# Patient Record
Sex: FEMALE | Race: WHITE | Employment: OTHER | URBAN - METROPOLITAN AREA
[De-identification: names, ages, dates, MRNs, and addresses within clinical notes are randomized per-mention and may not be internally consistent; named-entity substitution may affect disease eponyms.]

---

## 2018-08-03 ENCOUNTER — HOSPITAL ENCOUNTER (OUTPATIENT)
Dept: GENERAL RADIOLOGY | Age: 68
Discharge: HOME | End: 2018-08-05
Payer: COMMERCIAL

## 2018-08-03 ENCOUNTER — HOSPITAL ENCOUNTER (OUTPATIENT)
Age: 68
Discharge: HOME | End: 2018-08-05
Payer: COMMERCIAL

## 2018-08-03 DIAGNOSIS — M54.2 NECK PAIN: ICD-10-CM

## 2018-08-03 PROCEDURE — 72040 X-RAY EXAM NECK SPINE 2-3 VW: CPT

## 2022-05-02 ENCOUNTER — APPOINTMENT (OUTPATIENT)
Dept: GENERAL RADIOLOGY | Age: 72
End: 2022-05-02
Payer: MEDICARE

## 2022-05-02 ENCOUNTER — HOSPITAL ENCOUNTER (OUTPATIENT)
Age: 72
Setting detail: OBSERVATION
Discharge: HOME | End: 2022-05-04
Attending: EMERGENCY MEDICINE | Admitting: INTERNAL MEDICINE
Payer: MEDICARE

## 2022-05-02 DIAGNOSIS — R07.9 CHEST PAIN, UNSPECIFIED TYPE: Primary | ICD-10-CM

## 2022-05-02 DIAGNOSIS — I10 HYPERTENSION, UNSPECIFIED TYPE: ICD-10-CM

## 2022-05-02 LAB
ABSOLUTE EOS #: 0.29 K/UL (ref 0–0.44)
ABSOLUTE IMMATURE GRANULOCYTE: 0.02 K/UL (ref 0–0.3)
ABSOLUTE LYMPH #: 1.79 K/UL (ref 1.1–3.7)
ABSOLUTE MONO #: 0.56 K/UL (ref 0.1–1.2)
ANION GAP SERPL CALCULATED.3IONS-SCNC: 14 MMOL/L (ref 9–17)
BASOPHILS # BLD: 1 % (ref 0–2)
BASOPHILS ABSOLUTE: 0.07 K/UL (ref 0–0.2)
BUN BLDV-MCNC: 13 MG/DL (ref 8–23)
BUN/CREAT BLD: 19 (ref 9–20)
CALCIUM SERPL-MCNC: 9.7 MG/DL (ref 8.6–10.4)
CHLORIDE BLD-SCNC: 103 MMOL/L (ref 98–107)
CO2: 25 MMOL/L (ref 20–31)
CREAT SERPL-MCNC: 0.7 MG/DL (ref 0.5–0.9)
D-DIMER QUANTITATIVE: 0.42 MG/L FEU (ref 0–0.59)
EOSINOPHILS RELATIVE PERCENT: 4 % (ref 1–4)
GFR AFRICAN AMERICAN: >60 ML/MIN
GFR NON-AFRICAN AMERICAN: >60 ML/MIN
GFR SERPL CREATININE-BSD FRML MDRD: ABNORMAL ML/MIN/{1.73_M2}
GLUCOSE BLD-MCNC: 101 MG/DL (ref 70–99)
GLUCOSE BLD-MCNC: 99 MG/DL (ref 65–105)
HCT VFR BLD CALC: 48.4 % (ref 36.3–47.1)
HEMOGLOBIN: 15.3 G/DL (ref 11.9–15.1)
IMMATURE GRANULOCYTES: 0 %
LYMPHOCYTES # BLD: 22 % (ref 24–43)
MCH RBC QN AUTO: 27.6 PG (ref 25.2–33.5)
MCHC RBC AUTO-ENTMCNC: 31.6 G/DL (ref 28.4–34.8)
MCV RBC AUTO: 87.2 FL (ref 82.6–102.9)
MONOCYTES # BLD: 7 % (ref 3–12)
MYOGLOBIN: 22 NG/ML (ref 25–58)
MYOGLOBIN: 24 NG/ML (ref 25–58)
MYOGLOBIN: <21 NG/ML (ref 25–58)
NRBC AUTOMATED: 0 PER 100 WBC
PDW BLD-RTO: 13.6 % (ref 11.8–14.4)
PLATELET # BLD: 307 K/UL (ref 138–453)
PMV BLD AUTO: 8.7 FL (ref 8.1–13.5)
POTASSIUM SERPL-SCNC: 4.1 MMOL/L (ref 3.7–5.3)
RBC # BLD: 5.55 M/UL (ref 3.95–5.11)
SEG NEUTROPHILS: 66 % (ref 36–65)
SEGMENTED NEUTROPHILS ABSOLUTE COUNT: 5.45 K/UL (ref 1.5–8.1)
SODIUM BLD-SCNC: 142 MMOL/L (ref 135–144)
TROPONIN, HIGH SENSITIVITY: <6 NG/L (ref 0–14)
WBC # BLD: 8.2 K/UL (ref 3.5–11.3)

## 2022-05-02 PROCEDURE — G0378 HOSPITAL OBSERVATION PER HR: HCPCS

## 2022-05-02 PROCEDURE — 71045 X-RAY EXAM CHEST 1 VIEW: CPT

## 2022-05-02 PROCEDURE — 99222 1ST HOSP IP/OBS MODERATE 55: CPT | Performed by: NURSE PRACTITIONER

## 2022-05-02 PROCEDURE — 99285 EMERGENCY DEPT VISIT HI MDM: CPT

## 2022-05-02 PROCEDURE — 84484 ASSAY OF TROPONIN QUANT: CPT

## 2022-05-02 PROCEDURE — 1200000000 HC SEMI PRIVATE

## 2022-05-02 PROCEDURE — 85379 FIBRIN DEGRADATION QUANT: CPT

## 2022-05-02 PROCEDURE — 6370000000 HC RX 637 (ALT 250 FOR IP): Performed by: NURSE PRACTITIONER

## 2022-05-02 PROCEDURE — 2580000003 HC RX 258: Performed by: NURSE PRACTITIONER

## 2022-05-02 PROCEDURE — 85025 COMPLETE CBC W/AUTO DIFF WBC: CPT

## 2022-05-02 PROCEDURE — 80048 BASIC METABOLIC PNL TOTAL CA: CPT

## 2022-05-02 PROCEDURE — 73030 X-RAY EXAM OF SHOULDER: CPT

## 2022-05-02 PROCEDURE — 83874 ASSAY OF MYOGLOBIN: CPT

## 2022-05-02 PROCEDURE — 2500000003 HC RX 250 WO HCPCS: Performed by: PHYSICIAN ASSISTANT

## 2022-05-02 PROCEDURE — 82947 ASSAY GLUCOSE BLOOD QUANT: CPT

## 2022-05-02 PROCEDURE — 6370000000 HC RX 637 (ALT 250 FOR IP): Performed by: PHYSICIAN ASSISTANT

## 2022-05-02 PROCEDURE — 93005 ELECTROCARDIOGRAM TRACING: CPT | Performed by: EMERGENCY MEDICINE

## 2022-05-02 PROCEDURE — 96374 THER/PROPH/DIAG INJ IV PUSH: CPT

## 2022-05-02 PROCEDURE — 36415 COLL VENOUS BLD VENIPUNCTURE: CPT

## 2022-05-02 RX ORDER — SODIUM CHLORIDE 0.9 % (FLUSH) 0.9 %
5-40 SYRINGE (ML) INJECTION PRN
Status: DISCONTINUED | OUTPATIENT
Start: 2022-05-02 | End: 2022-05-04 | Stop reason: HOSPADM

## 2022-05-02 RX ORDER — METOPROLOL TARTRATE 5 MG/5ML
5 INJECTION INTRAVENOUS EVERY 8 HOURS PRN
Status: DISCONTINUED | OUTPATIENT
Start: 2022-05-02 | End: 2022-05-03

## 2022-05-02 RX ORDER — ACETAMINOPHEN 325 MG/1
650 TABLET ORAL EVERY 6 HOURS PRN
Status: DISCONTINUED | OUTPATIENT
Start: 2022-05-02 | End: 2022-05-04 | Stop reason: HOSPADM

## 2022-05-02 RX ORDER — ONDANSETRON 2 MG/ML
4 INJECTION INTRAMUSCULAR; INTRAVENOUS EVERY 6 HOURS PRN
Status: DISCONTINUED | OUTPATIENT
Start: 2022-05-02 | End: 2022-05-04 | Stop reason: HOSPADM

## 2022-05-02 RX ORDER — SODIUM CHLORIDE 0.9 % (FLUSH) 0.9 %
5-40 SYRINGE (ML) INJECTION EVERY 12 HOURS SCHEDULED
Status: DISCONTINUED | OUTPATIENT
Start: 2022-05-02 | End: 2022-05-04 | Stop reason: HOSPADM

## 2022-05-02 RX ORDER — HYDROXYZINE HYDROCHLORIDE 25 MG/1
50 TABLET, FILM COATED ORAL DAILY
Status: DISCONTINUED | OUTPATIENT
Start: 2022-05-03 | End: 2022-05-03

## 2022-05-02 RX ORDER — ENOXAPARIN SODIUM 100 MG/ML
30 INJECTION SUBCUTANEOUS 2 TIMES DAILY
Status: DISCONTINUED | OUTPATIENT
Start: 2022-05-03 | End: 2022-05-04 | Stop reason: HOSPADM

## 2022-05-02 RX ORDER — ATORVASTATIN CALCIUM 40 MG/1
40 TABLET, FILM COATED ORAL NIGHTLY
Status: DISCONTINUED | OUTPATIENT
Start: 2022-05-02 | End: 2022-05-03

## 2022-05-02 RX ORDER — PANTOPRAZOLE SODIUM 40 MG/1
40 TABLET, DELAYED RELEASE ORAL DAILY
Status: DISCONTINUED | OUTPATIENT
Start: 2022-05-02 | End: 2022-05-04 | Stop reason: HOSPADM

## 2022-05-02 RX ORDER — POLYETHYLENE GLYCOL 3350 17 G/17G
17 POWDER, FOR SOLUTION ORAL DAILY PRN
Status: DISCONTINUED | OUTPATIENT
Start: 2022-05-02 | End: 2022-05-04 | Stop reason: HOSPADM

## 2022-05-02 RX ORDER — METOPROLOL TARTRATE 5 MG/5ML
5 INJECTION INTRAVENOUS ONCE
Status: COMPLETED | OUTPATIENT
Start: 2022-05-02 | End: 2022-05-02

## 2022-05-02 RX ORDER — EZETIMIBE 10 MG/1
10 TABLET ORAL DAILY
Status: DISCONTINUED | OUTPATIENT
Start: 2022-05-02 | End: 2022-05-04 | Stop reason: HOSPADM

## 2022-05-02 RX ORDER — SODIUM CHLORIDE 9 MG/ML
INJECTION, SOLUTION INTRAVENOUS PRN
Status: DISCONTINUED | OUTPATIENT
Start: 2022-05-02 | End: 2022-05-04 | Stop reason: HOSPADM

## 2022-05-02 RX ORDER — HYDROCODONE BITARTRATE AND ACETAMINOPHEN 5; 325 MG/1; MG/1
1 TABLET ORAL EVERY 4 HOURS PRN
Status: DISCONTINUED | OUTPATIENT
Start: 2022-05-02 | End: 2022-05-04 | Stop reason: HOSPADM

## 2022-05-02 RX ORDER — ASPIRIN 81 MG/1
81 TABLET, CHEWABLE ORAL DAILY
Status: DISCONTINUED | OUTPATIENT
Start: 2022-05-03 | End: 2022-05-04 | Stop reason: HOSPADM

## 2022-05-02 RX ORDER — ASPIRIN 81 MG/1
324 TABLET, CHEWABLE ORAL ONCE
Status: COMPLETED | OUTPATIENT
Start: 2022-05-02 | End: 2022-05-02

## 2022-05-02 RX ORDER — TRAMADOL HYDROCHLORIDE 50 MG/1
25 TABLET ORAL EVERY 6 HOURS PRN
Status: DISCONTINUED | OUTPATIENT
Start: 2022-05-02 | End: 2022-05-03 | Stop reason: SDUPTHER

## 2022-05-02 RX ORDER — ZOLPIDEM TARTRATE 5 MG/1
5 TABLET ORAL NIGHTLY PRN
Status: DISCONTINUED | OUTPATIENT
Start: 2022-05-02 | End: 2022-05-02

## 2022-05-02 RX ORDER — ENOXAPARIN SODIUM 100 MG/ML
40 INJECTION SUBCUTANEOUS DAILY
Status: DISCONTINUED | OUTPATIENT
Start: 2022-05-03 | End: 2022-05-02 | Stop reason: DRUGHIGH

## 2022-05-02 RX ORDER — CALCIUM CARBONATE-CHOLECALCIFEROL TAB 250 MG-125 UNIT 250-125 MG-UNIT
1 TAB ORAL DAILY
Status: DISCONTINUED | OUTPATIENT
Start: 2022-05-03 | End: 2022-05-04 | Stop reason: HOSPADM

## 2022-05-02 RX ORDER — ONDANSETRON 4 MG/1
4 TABLET, ORALLY DISINTEGRATING ORAL EVERY 8 HOURS PRN
Status: DISCONTINUED | OUTPATIENT
Start: 2022-05-02 | End: 2022-05-04 | Stop reason: HOSPADM

## 2022-05-02 RX ORDER — ACETAMINOPHEN 650 MG/1
650 SUPPOSITORY RECTAL EVERY 6 HOURS PRN
Status: DISCONTINUED | OUTPATIENT
Start: 2022-05-02 | End: 2022-05-04 | Stop reason: HOSPADM

## 2022-05-02 RX ORDER — HYDROCODONE BITARTRATE AND ACETAMINOPHEN 5; 325 MG/1; MG/1
2 TABLET ORAL EVERY 4 HOURS PRN
Status: DISCONTINUED | OUTPATIENT
Start: 2022-05-02 | End: 2022-05-04 | Stop reason: HOSPADM

## 2022-05-02 RX ADMIN — ATORVASTATIN CALCIUM 40 MG: 40 TABLET, FILM COATED ORAL at 23:10

## 2022-05-02 RX ADMIN — ASPIRIN 81 MG 324 MG: 81 TABLET ORAL at 17:41

## 2022-05-02 RX ADMIN — TRAMADOL HYDROCHLORIDE 25 MG: 50 TABLET, COATED ORAL at 20:06

## 2022-05-02 RX ADMIN — EZETIMIBE 10 MG: 10 TABLET ORAL at 23:32

## 2022-05-02 RX ADMIN — SODIUM CHLORIDE, PRESERVATIVE FREE 10 ML: 5 INJECTION INTRAVENOUS at 23:12

## 2022-05-02 RX ADMIN — PANTOPRAZOLE SODIUM 40 MG: 40 TABLET, DELAYED RELEASE ORAL at 23:32

## 2022-05-02 RX ADMIN — METOPROLOL TARTRATE 5 MG: 5 INJECTION INTRAVENOUS at 19:09

## 2022-05-02 RX ADMIN — HYDROCODONE BITARTRATE AND ACETAMINOPHEN 2 TABLET: 5; 325 TABLET ORAL at 23:08

## 2022-05-02 ASSESSMENT — PAIN - FUNCTIONAL ASSESSMENT
PAIN_FUNCTIONAL_ASSESSMENT: ACTIVITIES ARE NOT PREVENTED
PAIN_FUNCTIONAL_ASSESSMENT: ACTIVITIES ARE NOT PREVENTED

## 2022-05-02 ASSESSMENT — PAIN DESCRIPTION - DIRECTION: RADIATING_TOWARDS: SHOULDER AND ARM

## 2022-05-02 ASSESSMENT — HEART SCORE
ECG: 0
ECG: 0

## 2022-05-02 ASSESSMENT — PAIN DESCRIPTION - LOCATION
LOCATION: CHEST
LOCATION: SHOULDER
LOCATION: SHOULDER

## 2022-05-02 ASSESSMENT — PAIN SCALES - GENERAL
PAINLEVEL_OUTOF10: 3
PAINLEVEL_OUTOF10: 4
PAINLEVEL_OUTOF10: 5
PAINLEVEL_OUTOF10: 2

## 2022-05-02 ASSESSMENT — PAIN DESCRIPTION - DESCRIPTORS
DESCRIPTORS: ACHING
DESCRIPTORS: ACHING

## 2022-05-02 ASSESSMENT — PAIN DESCRIPTION - ORIENTATION
ORIENTATION: LEFT
ORIENTATION: LEFT;POSTERIOR
ORIENTATION: LEFT;ANTERIOR;POSTERIOR

## 2022-05-02 ASSESSMENT — PAIN DESCRIPTION - PAIN TYPE
TYPE: ACUTE PAIN
TYPE: ACUTE PAIN

## 2022-05-02 ASSESSMENT — PAIN DESCRIPTION - FREQUENCY: FREQUENCY: CONTINUOUS

## 2022-05-02 NOTE — ED PROVIDER NOTES
[address] [place] [address] ED  eMERGENCY dEPARTMENTFormerly [facility]      Pt Name: Chelsey Hawthorne  MRN: [id number]  [place] 1950  Date ofevaluation: 5/2/2022  Provider: Katherine Rg Dr       Chief Complaint   Patient presents with    Chest Pain         HISTORY OF PRESENT ILLNESS  (Location/Symptom, Timing/Onset, Context/Setting, Quality, Duration, Modifying Factors, Severity.)   Chelsey Hawthorne is a 80 y.o. female who presents to the emergency department with left shoulder pain since Friday. Pain radiates from the left upper chest into her left arm to the elbow. No definite alleviating or aggravating factors. Nursing Notes were reviewed. ALLERGIES     Dye [iodides]    CURRENT MEDICATIONS       Previous Medications    CALCIUM-VITAMIN D (OSCAL) 250-125 MG-UNIT PER TABLET    Take 1 tablet by mouth daily    CHOLECALCIFEROL (VITAMIN D) 2000 UNITS CAPS CAPSULE    Take by mouth    DOXYCYCLINE (VIBRA-TABS) 100 MG TABLET    Take 1 tablet by mouth 2 times daily    EZETIMIBE (ZETIA) 10 MG TABLET    Take 10 mg by mouth daily    HYDROXYZINE (ATARAX) 50 MG TABLET    Take 50 mg by mouth daily    IBUPROFEN (ADVIL;MOTRIN) 800 MG TABLET    Take 1 tablet by mouth every 8 hours as needed for Pain    OMEPRAZOLE (PRILOSEC) 40 MG CAPSULE    Take 40 mg by mouth daily    OXYCODONE-ACETAMINOPHEN (PERCOCET) 5-325 MG PER TABLET    Take 1-2 tablets by mouth every 4 hours as needed for Pain    ZOLPIDEM (AMBIEN) 5 MG TABLET    Take 5 mg by mouth nightly as needed for Sleep       PAST MEDICAL HISTORY         Diagnosis Date    GERD (gastroesophageal reflux disease)     Hyperlipidemia     Insomnia        SURGICAL HISTORY           Procedure Laterality Date    HERNIA REPAIR           FAMILY HISTORY     History reviewed. No pertinent family history. No family status information on file. SOCIAL HISTORY      reports that she has quit smoking. She does not have any smokeless tobacco history on file.  She reports current alcohol use. She reports that she does not use drugs. REVIEW OFSYSTEMS    (2-9 systems for level 4, 10 or more for level 5)   Review of Systems    Except as noted above the remainder of the review of systems was reviewed and negative. PHYSICAL EXAM    (up to 7 for level 4, 8 or more for level 5)     ED Triage Vitals [05/02/22 1617]   BP Temp Temp Source Pulse Resp SpO2 Height Weight   (!) 172/116 97.8 °F (36.6 °C) Oral 98 18 97 % 5' 6\" (1.676 m) 232 lb (105.2 kg)      Physical Exam  Constitutional:       Appearance: She is well-developed. HENT:      Head: Normocephalic and atraumatic. Cardiovascular:      Rate and Rhythm: Normal rate and regular rhythm. Pulmonary:      Effort: Pulmonary effort is normal.      Breath sounds: Normal breath sounds. Abdominal:      Palpations: Abdomen is soft. Musculoskeletal:         General: Normal range of motion. Cervical back: Normal range of motion and neck supple. Skin:     General: Skin is warm. Findings: No rash. Neurological:      Mental Status: She is alert and oriented to person, place, and time. Psychiatric:         Behavior: Behavior normal.                 DIAGNOSTIC RESULTS     EKG: All EKG's are interpreted by the Emergency Department Physician who either signs or Co-signs this chart in the absence of a cardiologist.  EKG interpreted by ED physician. Time 1623.  96 bpm.  Normal sinus rhythm. No ST segment elevation.       RADIOLOGY:   Non-plain film images such as CT, Ultrasound and MRI are read by the radiologist. Plain radiographic images arevisualized and preliminarily interpreted by the emergency physician with the below findings:        Interpretation per the Radiologist below, if available at thetime of this note:          ED BEDSIDE ULTRASOUND:   Performed by ED Physician - none    LABS:  Labs Reviewed   TROP/MYOGLOBIN - Abnormal; Notable for the following components:       Result Value    Myoglobin 24 (*)     All other components within normal limits   BASIC METABOLIC PANEL - Abnormal; Notable for the following components:    Glucose 101 (*)     All other components within normal limits   CBC WITH AUTO DIFFERENTIAL - Abnormal; Notable for the following components:    RBC 5.55 (*)     Hemoglobin 15.3 (*)     Hematocrit 48.4 (*)     Seg Neutrophils 66 (*)     Lymphocytes 22 (*)     All other components within normal limits   D-DIMER, QUANTITATIVE   TROP/MYOGLOBIN   TROP/MYOGLOBIN       All other labs were within normal range or not returned as of this dictation. EMERGENCY DEPARTMENT COURSE and DIFFERENTIAL DIAGNOSIS/MDM:   Vitals:    Vitals:    05/02/22 1617 05/02/22 1839 05/02/22 1845   BP: (!) 172/116 (!) 150/102 (!) 140/105   Pulse: 98 87 88   Resp: 18 20 21   Temp: 97.8 °F (36.6 °C)     TempSrc: Oral     SpO2: 97% 94% 93%   Weight: 232 lb (105.2 kg)     Height: 5' 6\" (1.676 m)       Will admit. Heart score is 5. Given patient's age risk factors she will admit for further cardiac evaluation. Patient has not had any provocative cardiac testing in over 5 years. 6:50 PM EDT  Spoke with dr Sophie Huerta and he asked that we discuss case cardiology prior to admitting.     6:56 PM EDT  Spoke with dr Colby Sykes . He recommends admitting the patient and doing stress int he morning. IV lopressor GIVEN FOR htn                                CONSULTS:  IP CONSULT TO HOSPITALIST  IP CONSULT TO CARDIOLOGY    PROCEDURES:  Procedures  CRITICAL CARE TIME     Due to the high probability of sudden and clinically significant deterioration in the patient's condition she required highest level of my preparedness to intervene urgently. I provided critical care time including documentation time, medication orders and management, reevaluation, vital sign assessment, ordering and reviewing of of lab tests ordering and reviewing of x-ray studies, and admission orders.  Aggregate critical care time is between 35  minutes including only time during which I was engaged in work directly related to her care and did not include time spent treating other patients simultaneously. FINAL IMPRESSION      1. Chest pain, unspecified type    2. Hypertension, unspecified type          DISPOSITION/PLAN   DISPOSITION Decision To Admit 05/02/2022 06:29:59 PM      PATIENTREFERRED TO:   No follow-up provider specified.     DISCHARGE MEDICATIONS:     New Prescriptions    No medications on file           (Please note that portions of this note were completed with a voice recognition program.  Efforts were made to edit thedictations but occasionally words are mis-transcribed.)    DENI Dickerson PA-C  05/02/22 8762

## 2022-05-02 NOTE — ED PROVIDER NOTES
(gastroesophageal reflux disease)     Hyperlipidemia     Insomnia      SURGICAL HISTORY       Past Surgical History:   Procedure Laterality Date    HERNIA REPAIR       CURRENT MEDICATIONS       Previous Medications    CALCIUM-VITAMIN D (OSCAL) 250-125 MG-UNIT PER TABLET    Take 1 tablet by mouth daily    CHOLECALCIFEROL (VITAMIN D) 2000 UNITS CAPS CAPSULE    Take by mouth    DOXYCYCLINE (VIBRA-TABS) 100 MG TABLET    Take 1 tablet by mouth 2 times daily    EZETIMIBE (ZETIA) 10 MG TABLET    Take 10 mg by mouth daily    HYDROXYZINE (ATARAX) 50 MG TABLET    Take 50 mg by mouth daily    IBUPROFEN (ADVIL;MOTRIN) 800 MG TABLET    Take 1 tablet by mouth every 8 hours as needed for Pain    OMEPRAZOLE (PRILOSEC) 40 MG CAPSULE    Take 40 mg by mouth daily    OXYCODONE-ACETAMINOPHEN (PERCOCET) 5-325 MG PER TABLET    Take 1-2 tablets by mouth every 4 hours as needed for Pain    ZOLPIDEM (AMBIEN) 5 MG TABLET    Take 5 mg by mouth nightly as needed for Sleep     ALLERGIES     is allergic to dye [iodides]. FAMILY HISTORY     has no family status information on file. SOCIAL HISTORY       Social History     Tobacco Use    Smoking status: Former Smoker    Smokeless tobacco: Not on file   Substance Use Topics    Alcohol use: Yes     Comment: social    Drug use: No          Hemanth Rae MD  The care is provided during an unprecedented national emergency due to the novel coronavirus, COVID 19.   Attending Emergency Physician          Hemanth Rae MD  29/39/83 029

## 2022-05-03 LAB
EKG ATRIAL RATE: 79 BPM
EKG ATRIAL RATE: 96 BPM
EKG P AXIS: 37 DEGREES
EKG P AXIS: 62 DEGREES
EKG P-R INTERVAL: 142 MS
EKG P-R INTERVAL: 144 MS
EKG Q-T INTERVAL: 366 MS
EKG Q-T INTERVAL: 392 MS
EKG QRS DURATION: 82 MS
EKG QRS DURATION: 82 MS
EKG QTC CALCULATION (BAZETT): 449 MS
EKG QTC CALCULATION (BAZETT): 462 MS
EKG R AXIS: -37 DEGREES
EKG R AXIS: -38 DEGREES
EKG T AXIS: -7 DEGREES
EKG T AXIS: 37 DEGREES
EKG VENTRICULAR RATE: 79 BPM
EKG VENTRICULAR RATE: 96 BPM
HCT VFR BLD CALC: 45.3 % (ref 36.3–47.1)
HEMOGLOBIN: 14.4 G/DL (ref 11.9–15.1)
LV EF: 65 %
LVEF MODALITY: NORMAL
MCH RBC QN AUTO: 27.5 PG (ref 25.2–33.5)
MCHC RBC AUTO-ENTMCNC: 31.8 G/DL (ref 28.4–34.8)
MCV RBC AUTO: 86.6 FL (ref 82.6–102.9)
NRBC AUTOMATED: 0 PER 100 WBC
PDW BLD-RTO: 13.6 % (ref 11.8–14.4)
PLATELET # BLD: 151 K/UL (ref 138–453)
PMV BLD AUTO: 10.7 FL (ref 8.1–13.5)
RBC # BLD: 5.23 M/UL (ref 3.95–5.11)
TROPONIN, HIGH SENSITIVITY: <6 NG/L (ref 0–14)
WBC # BLD: 6.5 K/UL (ref 3.5–11.3)

## 2022-05-03 PROCEDURE — 93005 ELECTROCARDIOGRAM TRACING: CPT | Performed by: NURSE PRACTITIONER

## 2022-05-03 PROCEDURE — G0378 HOSPITAL OBSERVATION PER HR: HCPCS

## 2022-05-03 PROCEDURE — 6370000000 HC RX 637 (ALT 250 FOR IP): Performed by: NURSE PRACTITIONER

## 2022-05-03 PROCEDURE — 2580000003 HC RX 258: Performed by: NURSE PRACTITIONER

## 2022-05-03 PROCEDURE — 36415 COLL VENOUS BLD VENIPUNCTURE: CPT

## 2022-05-03 PROCEDURE — 84484 ASSAY OF TROPONIN QUANT: CPT

## 2022-05-03 PROCEDURE — 93005 ELECTROCARDIOGRAM TRACING: CPT | Performed by: PHYSICIAN ASSISTANT

## 2022-05-03 PROCEDURE — 85027 COMPLETE CBC AUTOMATED: CPT

## 2022-05-03 PROCEDURE — 93306 TTE W/DOPPLER COMPLETE: CPT

## 2022-05-03 PROCEDURE — 99225 PR SBSQ OBSERVATION CARE/DAY 25 MINUTES: CPT | Performed by: INTERNAL MEDICINE

## 2022-05-03 RX ORDER — POTASSIUM CHLORIDE 20 MEQ/1
40 TABLET, EXTENDED RELEASE ORAL PRN
Status: DISCONTINUED | OUTPATIENT
Start: 2022-05-03 | End: 2022-05-04 | Stop reason: HOSPADM

## 2022-05-03 RX ORDER — ASPIRIN 81 MG/1
81 TABLET, CHEWABLE ORAL DAILY
COMMUNITY

## 2022-05-03 RX ORDER — LISINOPRIL 5 MG/1
5 TABLET ORAL DAILY
Status: DISCONTINUED | OUTPATIENT
Start: 2022-05-03 | End: 2022-05-04 | Stop reason: HOSPADM

## 2022-05-03 RX ORDER — MAGNESIUM SULFATE 1 G/100ML
1000 INJECTION INTRAVENOUS PRN
Status: DISCONTINUED | OUTPATIENT
Start: 2022-05-03 | End: 2022-05-04 | Stop reason: HOSPADM

## 2022-05-03 RX ORDER — POTASSIUM CHLORIDE 7.45 MG/ML
10 INJECTION INTRAVENOUS PRN
Status: DISCONTINUED | OUTPATIENT
Start: 2022-05-03 | End: 2022-05-04 | Stop reason: HOSPADM

## 2022-05-03 RX ADMIN — SODIUM CHLORIDE, PRESERVATIVE FREE 10 ML: 5 INJECTION INTRAVENOUS at 10:20

## 2022-05-03 RX ADMIN — LISINOPRIL 5 MG: 5 TABLET ORAL at 10:13

## 2022-05-03 RX ADMIN — SODIUM CHLORIDE, PRESERVATIVE FREE 10 ML: 5 INJECTION INTRAVENOUS at 21:23

## 2022-05-03 RX ADMIN — PANTOPRAZOLE SODIUM 40 MG: 40 TABLET, DELAYED RELEASE ORAL at 21:23

## 2022-05-03 RX ADMIN — HYDROCODONE BITARTRATE AND ACETAMINOPHEN 1 TABLET: 5; 325 TABLET ORAL at 20:14

## 2022-05-03 RX ADMIN — EZETIMIBE 10 MG: 10 TABLET ORAL at 21:23

## 2022-05-03 RX ADMIN — HYDROCODONE BITARTRATE AND ACETAMINOPHEN 1 TABLET: 5; 325 TABLET ORAL at 13:12

## 2022-05-03 RX ADMIN — ACETAMINOPHEN 650 MG: 325 TABLET ORAL at 10:11

## 2022-05-03 RX ADMIN — ASPIRIN 81 MG 81 MG: 81 TABLET ORAL at 10:09

## 2022-05-03 ASSESSMENT — PAIN DESCRIPTION - ORIENTATION
ORIENTATION: MID
ORIENTATION: LEFT
ORIENTATION: LEFT

## 2022-05-03 ASSESSMENT — PAIN DESCRIPTION - LOCATION
LOCATION: HEAD
LOCATION: SHOULDER
LOCATION: SHOULDER

## 2022-05-03 ASSESSMENT — ENCOUNTER SYMPTOMS
BLOOD IN STOOL: 0
COLOR CHANGE: 0
SHORTNESS OF BREATH: 0
NAUSEA: 0
COUGH: 0
DIARRHEA: 0
ABDOMINAL PAIN: 0
VOMITING: 0
CONSTIPATION: 0

## 2022-05-03 ASSESSMENT — PAIN DESCRIPTION - DESCRIPTORS
DESCRIPTORS: ACHING
DESCRIPTORS: ACHING;SORE

## 2022-05-03 ASSESSMENT — PAIN SCALES - GENERAL
PAINLEVEL_OUTOF10: 3
PAINLEVEL_OUTOF10: 4
PAINLEVEL_OUTOF10: 4
PAINLEVEL_OUTOF10: 0

## 2022-05-03 NOTE — PROGRESS NOTES
[facility]  Office: [address], DO, Adam Reardon, DO, Adelfo Bowden, DO, Joyce Alfredo Blood, DO, Sarah Bauer MD, Lisy Phan MD, Brenna Patricia MD, Heidi Nixon MD, Carley Enamorado MD, Chepe Rodriguez MD, Ashish Grier MD, Jonelle Self, DO, Becca Melendrez, DO, Gabriele Hendrickson MD,  Mattie Davila DO, Sharyn Bell MD, Michael Enamorado MD, Wang Henriquez MD, Carie Gonsalez DO, Eli Marquez MD, Yoshi Garcia MD, Olivia Faith [facility], [facility], CNP, Kelle Marcus, CNP, Zarina Wolfe, CNP, Irving Faye, CNP, Lalit Quezada, CNP, ABISAI CrumpC, Alena Graf, [facility], Crystal Graves, CNP, Laura Zaragoza, CNP, Ayse Fang, CNP, Piyush Kelley, CNS, Dolores Ogden, [facility], Darwin Mobley, CNP, Amish Brown, CNP, Nancy Montenegro, [address]    Progress Note    5/3/2022    3:06 PM    Name:   Jean De La Fuente  MRN:     [id number]     [place]:      [de-identified]   Room:   2014/2014-02   Day:  1  Admit Date:  5/2/2022  4:57 PM    PCP:   Irma Lomas MD  Code Status:  Full Code    Subjective:     C/C:   Chief Complaint   Patient presents with    Chest Pain     Interval History Status: improved. Patient seen and examined this afternoon.  is at bedside. Patient continues to have left shoulder pain. She does admit intermittent appearing chest pain. No nausea, vomiting, diarrhea. States that she did sleep uncomfortably on Friday. Also reports lifting a heavy backpack last week. Reports that she had her last stress test about 5 years ago after she was having significant right shoulder pain. Brief History:     Aidee Martinez is a 80year old  female who presented to [place] [address] on 5/2/2022 for evaluation of chest pressure. Troponin and EKG negative. She quit smoking in 1982. Has a strong family history. Likely musculoskeletal in nature.   Checking 2D echo and possible auscultation bilaterally, normal effort  Heart:  regular rate and rhythm, no murmur  Abdomen:  soft, nontender, nondistended, normal bowel sounds, no masses, hepatomegaly, splenomegaly  Extremities:  no edema, redness, tenderness in the calves  MSK: negative neer and hawkin's sign, no tenderness to palpation of the chest wall or back  Skin:  no gross lesions, rashes, induration    Assessment:        Hospital Problems           Last Modified POA    * (Principal) Atypical chest pain 5/3/2022 Yes    GERD (gastroesophageal reflux disease) 5/3/2022 Yes    Dyslipidemia 5/3/2022 Yes    Primary hypertension 5/3/2022 Yes    Class 2 severe obesity due to excess calories with serious comorbidity and body mass index (BMI) of 37.0 to 37.9 in adult ([facility] [address] 5/3/2022 Yes          Plan:        1. Atypical chest pain - appreciate cardiology's input. Checking 2D echo. If low risk, will schedule outpatient stress test.  2. GERD-continue Protonix  3. Dyslipidemia-continue Zetia  4. Essential hypertension- continue lisinopril 5 mg daily. 5. Obesity - BMI 38.9. Diet/weight loss/exercise recommended  6.  Change to obs status    CHRISTINA ESTRADA DO  5/3/2022  3:06 PM

## 2022-05-03 NOTE — PLAN OF CARE
Problem: Cardiovascular - Adult  Goal: Maintains optimal cardiac output and hemodynamic stability  Outcome: Progressing  Goal: Absence of cardiac dysrhythmias or at baseline  Outcome: Progressing     Problem: Pain  Goal: Verbalizes/displays adequate comfort level or baseline comfort level  Outcome: Adequate for Discharge     Problem: Safety - Adult  Goal: Free from fall injury  Outcome: Adequate for Discharge     Problem: ABCDS Injury Assessment  Goal: Absence of physical injury  Outcome: Adequate for Discharge

## 2022-05-03 NOTE — CONSULTS
Reason for Consult:  Chest pain  Requesting Physician: Ximena Mendez DO    CHIEF COMPLAINT:  Chest pain    History Obtained From:  patient, electronic medical record    HISTORY OF PRESENT ILLNESS:      The patient is a 80 y.o. female with significant past medical history of hyperlipidemia and no known prior cardiac history and does not follow with a cardiologist who presents with chest pain. The patient reports that she awoke on Friday with left elbow pain and over the next day she eventually developed chest pain and left elbow pain that made her concerned. She had symptoms continuously throughout the day that gets worse at night for the past 4 days. 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 She reports she is able to walk up and down the stairs to do laundry without any significant shortness of breath or chest pain. She reports that the chest pain is continuous without associated shortness of breath or diaphoresis. She denies having any palpitations, syncope, or near syncope. She denies any recent fever or chills. Blood pressure was admitted in ER but the patient reports normally her blood pressure is normotensive at her office visits. She feels it was elevated due to anxiety of being in the ER. Past Medical History:    Past Medical History:   Diagnosis Date    GERD (gastroesophageal reflux disease)     Hyperlipidemia     Insomnia      Past Surgical History:    Past Surgical History:   Procedure Laterality Date    HERNIA REPAIR       Home Medications:  Prior to Admission medications    Medication Sig Start Date End Date Taking?  Authorizing Provider   zolpidem (AMBIEN) 5 MG tablet Take 5 mg by mouth nightly as needed for Sleep  Patient not taking: Reported on 5/2/2022    Historical Provider, MD   hydrOXYzine (ATARAX) 50 MG tablet Take 50 mg by mouth daily    Historical Provider, MD   ezetimibe (ZETIA) 10 MG tablet Take 10 mg by mouth daily    Historical Provider, MD   omeprazole (PRILOSEC) 40 MG capsule Take 40 mg by mouth daily    Historical Provider, MD   calcium-vitamin D (OSCAL) 250-125 MG-UNIT per tablet Take 1 tablet by mouth daily    Historical Provider, MD   Cholecalciferol (VITAMIN D) 2000 UNITS CAPS capsule Take by mouth    Historical Provider, MD   ibuprofen (ADVIL;MOTRIN) 800 MG tablet Take 1 tablet by mouth every 8 hours as needed for Pain 9/5/15   Connie Nuñez MD   oxyCODONE-acetaminophen (PERCOCET) 5-325 MG per tablet Take 1-2 tablets by mouth every 4 hours as needed for Pain  Patient not taking: Reported on 5/2/2022 9/5/15   Connie Nuñez MD   doxycycline (VIBRA-TABS) 100 MG tablet Take 1 tablet by mouth 2 times daily  Patient not taking: Reported on 5/2/2022 9/5/15   Connie Nuñez MD     Current Medications:    Current Facility-Administered Medications   Medication Dose Route Frequency Provider Last Rate Last Admin    lisinopril (PRINIVIL;ZESTRIL) tablet 5 mg  5 mg Oral Daily Verlan Low, APRN - CNP        magnesium sulfate 1000 mg in dextrose 5% 100 mL IVPB  1,000 mg IntraVENous PRN Verlan Low, APRN - CNP        potassium chloride (KLOR-CON M) extended release tablet 40 mEq  40 mEq Oral PRN Verlan Low, APRN - CNP        Or    potassium bicarb-citric acid (EFFER-K) effervescent tablet 40 mEq  40 mEq Oral PRN Verlan Low, APRN - CNP        Or    potassium chloride 10 mEq/100 mL IVPB (Peripheral Line)  10 mEq IntraVENous PRN Verlan Low, APRN - CNP        calcium carb-cholecalciferol 250-125 MG-UNIT per tab 1 tablet  1 tablet Oral Daily Alexandria Stemple, APRN - NP        ezetimibe (ZETIA) tablet 10 mg  10 mg Oral Daily Alexandria Stemple, APRN - NP   10 mg at 05/02/22 2332    hydrOXYzine (ATARAX) tablet 50 mg  50 mg Oral Daily Alexandria Stemple, APRN - NP        pantoprazole (PROTONIX) tablet 40 mg  40 mg Oral Daily Alexandria Stemple, APRN - NP   40 mg at 05/02/22 2332    sodium chloride flush 0.9 % injection 5-40 mL  5-40 mL IntraVENous 2 times per day Alexandria Stemple, APRN - NP   10 mL at 05/02/22 2312    sodium chloride flush 0.9 % injection 5-40 mL  5-40 mL IntraVENous PRN Alexandria Stemple, APRN - NP        0.9 % sodium chloride infusion   IntraVENous PRN Alexandria Stemple, APRN - NP        ondansetron (ZOFRAN-ODT) disintegrating tablet 4 mg  4 mg Oral Q8H PRN Alexandria Stemple, APRN - NP        Or    ondansetron (ZOFRAN) injection 4 mg  4 mg IntraVENous Q6H PRN Alexandria Stemple, APRN - NP        acetaminophen (TYLENOL) tablet 650 mg  650 mg Oral Q6H PRN Alexandria Stemple, APRN - NP        Or    acetaminophen (TYLENOL) suppository 650 mg  650 mg Rectal Q6H PRN Alexandria Stemple, APRN - NP        polyethylene glycol (GLYCOLAX) packet 17 g  17 g Oral Daily PRN Alexandria Stemple, APRN - NP        aspirin chewable tablet 81 mg  81 mg Oral Daily Alexandria Stemple, APRN - NP        traMADol (ULTRAM) tablet 25 mg  25 mg Oral Q6H PRN Alexandria Stemple, APRN - NP   25 mg at 05/02/22 2006    HYDROcodone-acetaminophen (NORCO) 5-325 MG per tablet 1 tablet  1 tablet Oral Q4H PRN Ayse Cedar, APRN - CNP        Or    HYDROcodone-acetaminophen (NORCO) 5-325 MG per tablet 2 tablet  2 tablet Oral Q4H PRN Ayse Cedar, APRN - CNP   2 tablet at 05/02/22 2308    enoxaparin Sodium (LOVENOX) injection 30 mg  30 mg SubCUTAneous BID Alexandria Stemple, APRN - NP         Allergies:  Dye [iodides]    Social History:    Social History     Socioeconomic History    Marital status:      Spouse name: Not on file    Number of children: Not on file    Years of education: Not on file    Highest education level: Not on file   Occupational History    Not on file   Tobacco Use    Smoking status: Former Smoker    Smokeless tobacco: Not on file   Substance and Sexual Activity    Alcohol use: Yes     Comment: social    Drug use: No    Sexual activity: Not on file   Other Topics Concern  Not on file   Social History Narrative    Not on file     Social Determinants of Health     Financial Resource Strain:     Difficulty of Paying Living Expenses: Not on file   Food Insecurity:     Worried About Running Out of Food in the Last Year: Not on file    Nelson of Food in the Last Year: Not on file   Transportation Needs:     Lack of Transportation (Medical): Not on file    Lack of Transportation (Non-Medical):  Not on file   Physical Activity:     Days of Exercise per Week: Not on file    Minutes of Exercise per Session: Not on file   Stress:     Feeling of Stress : Not on file   Social Connections:     Frequency of Communication with Friends and Family: Not on file    Frequency of Social Gatherings with Friends and Family: Not on file    Attends Evangelical Services: Not on file    Active Member of [employer] or Organizations: Not on file    Attends Club or Organization Meetings: Not on file    Marital Status: Not on file   Intimate Partner Violence:     Fear of Current or Ex-Partner: Not on file    Emotionally Abused: Not on file    Physically Abused: Not on file    Sexually Abused: Not on file   Housing Stability:     Unable to Pay for Housing in the Last Year: Not on file    Number of [place] in the Last Year: Not on file    Unstable Housing in the Last Year: Not on file     Family History: Family history of CAD, brother  Family History   Problem Relation Age of Onset    No Known Problems Mother     No Known Problems Father        · REVIEW OF SYSTEMS   CONSTITUTIONAL: negative  EYES:  negative  HEENT:  negative  RESPIRATORY: negative   CARDIOVASCULAR:  negative except for  chest pain  GASTROINTESTINAL:  negative except for history of GERD  GENITOURINARY:  negative  INTEGUMENT:  negative  HEMATOLOGIC/LYMPHATIC:  negative  ALLERGIC/IMMUNOLOGIC:  negative except for allergy to dye  ENDOCRINE:  negative  MUSCULOSKELETAL:  negative except for left elbow pain  NEUROLOGICAL: negative, no history of CVA  BEHAVIOR/PSYCH:  negative    PHYSICAL EXAM:    Vitals:    VITALS:  BP (!) 122/102   Pulse 74   Temp 98.1 °F (36.7 °C) (Oral)   Resp 16   Ht 5' 6\" (1.676 m)   Wt 241 lb (109.3 kg)   SpO2 96%   BMI 38.90 kg/m²   24HR INTAKE/OUTPUT:      Intake/Output Summary (Last 24 hours) at 5/3/2022 0920  Last data filed at 5/3/2022 0602  Gross per 24 hour   Intake 10 ml   Output 350 ml   Net -340 ml       CONSTITUTIONAL:  awake, alert, cooperative, no apparent distress, and appears stated age  EYES: Sclera clear, conjunctiva normal  ENT:  normocepalic, without obvious abnormality  NECK:  supple, symmetrical, trachea midline, no carotid bruit, no JVD  BACK:  symmetric  LUNGS: Non-labored, good air exchange, clear to auscultation bilaterally, no crackles or wheezing  CARDIOVASCULAR:  Regular rate and rhythm, normal S1 and S2, no S3 or S4, and no murmur noted, no rub. Reproducible chest pain with palpation to the left anterior chest area that reproduces similar symptoms to her spontaneous pain  dorsalis pedis, posterior tibial and bilateralpresent 2+  ABDOMEN:  Normal bowel sounds, soft, non-distended, non-tender  MUSCULOSKELETAL:  there is no redness, warmth, or swelling of the joints   No leg edema. NEUROLOGIC:  Awake, alert, oriented to name, place and time. SKIN:  no bruising or bleeding, normal skin color, texture, turgor and no jaundice    DATA:   ECG:   Cardiology Labs:  Recent Labs     05/02/22  1740 05/02/22  1740 05/02/22  1924 05/02/22  2136 05/03/22  0028   MYOGLOBIN 24*  --  <21* 22*  --    TROPHS <6   < > <6 <6 <6    < > = values in this interval not displayed.      Warfarin PT/INR:No results found for: PROTIME, INR, WARFARIN  CBC:  Lab Results   Component Value Date    WBC 6.5 05/03/2022    RBC 5.23 05/03/2022    HGB 14.4 05/03/2022    HCT 45.3 05/03/2022    MCV 86.6 05/03/2022    MCH 27.5 05/03/2022    MCHC 31.8 05/03/2022    RDW 13.6 05/03/2022     05/03/2022    MPV 10.7 05/03/2022     CMP:  Lab Results   Component Value Date     05/02/2022    K 4.1 05/02/2022     05/02/2022    CO2 25 05/02/2022    BUN 13 05/02/2022    CREATININE 0.70 05/02/2022    GFRAA >60 05/02/2022    LABGLOM >60 05/02/2022    GLUCOSE 101 05/02/2022    CALCIUM 9.7 05/02/2022     Magnesium:  No results found for: MG  PTT:  No results found for: APTT, PTT  TSH:    Lab Results   Component Value Date    TSH 2.71 11/04/2011     BNP: No results for input(s): BNP, PROBNP in the last 72 hours. BMP:  Lab Results   Component Value Date     05/02/2022    K 4.1 05/02/2022     05/02/2022    CO2 25 05/02/2022    BUN 13 05/02/2022    CREATININE 0.70 05/02/2022    CALCIUM 9.7 05/02/2022    GFRAA >60 05/02/2022    LABGLOM >60 05/02/2022    GLUCOSE 101 05/02/2022     LIVER PROFILE:No results for input(s): AST, ALT, LABALBU, ALKPHOS, BILITOT, BILIDIR, IBILI, PROT, GLOB, ALBUMIN in the last 72 hours. FLP:    Lab Results   Component Value Date    CHOL 194 02/11/2013    TRIG 139 02/11/2013    HDL 54 02/11/2013    LDLCHOLESTEROL 112 02/11/2013     IMPRESSION    · Atypical chest pain without signs of acute MI but may be musculoskeletal in nature and has reproducible chest pain on exam with palpation to her left anterior chest area that is similar to her spontaneous pain  · Dyslipidemia, treated  · Obesity  · History of GERD  · Family history of CAD, brother    RECOMMENDATIONS:     Continue current cardiac medications. The patient's chest pain is atypical and constant for 4 days. May have a musculoskeletal component to her chest pain as she has reproducible chest pain on exam with palpation to her left anterior chest wall that reproduces similar symptoms to her spontaneous pain. Will check echocardiogram to evaluate LV systolic function and for any segmental wall motion abnormality.   If no significant abnormality is found and patient continues to have improvement in her chest pain will plan for outpatient exercise nuclear stress test to screen for significant ischemia. Management plan was discussed with patient, her  at the bedside, RN, and Dr. Sher Harrison. Thank you for the consultation.     Electronically signed by DINA Butterfield CNP on 5/3/2022 at 9:20 AM     CC: Monse Junior MD

## 2022-05-03 NOTE — H&P
[facility]  Office: [address], DO, Pedro Luis Matos, DO, Logan De La Fuente, DO, Karla Marvel Blood, DO, Emani Workman MD, Filemon Sims MD, Minerva Rodriguez MD, Júnior Hansen MD, Clayton Choudhury MD, Laurita Arzate MD, Lazarus Moats, MD, Jr Reese, DO, Lynne Matthews, DO, Ria Kwon MD,  Victorino Collins, DO, Jahaira Mae MD, Long Whitehead MD, Angel Perales MD, Vee Rao DO, Efren Garcia MD, Eden Pope MD, arnol Franklin, [facility], [facility], CNP, Derrek Kumar, CNP, Elvira Shabazz, CNP, Cordelia Masters, CNP, Rufus Monzon, CNP, Marie Cameron PA-C, Kerline López, [facility], Alejandro Triana, CNP, Giuseppe Covarrubias, CNP, Saeid Stevenson, CNP, Hemanth Dixon, CNS, Marta Torres, [facility], Vy Marin, CNP, Jose Fontanez, CNP, Chaya Anderson, [address]    HISTORY AND PHYSICAL EXAMINATION            Date:   5/2/2022  Patient name:  Tramaine Wilson  Date of admission:  5/2/2022  4:57 PM  MRN:   [id number]  Account:  [de-identified]  YOB: 1950  PCP:    Logan Vega MD  Room:   2014/2014-02  Code Status:    Full Code    Chief Complaint:     Chief Complaint   Patient presents with    Chest Pain     History Obtained From:     Patient and electronic medical record. History of Present Illness:     Traamine Wilson is a 80 y.o. Non- / non  female who presents with Chest Pain   and is admitted to the hospital for the management of Chest pain. Patient presents to the hospital with complaint of left chest, shoulder and arm pain. The patient states that her symptoms started approximately 4 days ago and have progressively worsened. She endorses pain to her left chest.  However she states her pain is greater in her left shoulder and arm. She describes her pain as constant, dull, aching and 4/10 in intensity. She states that worsens at night. She denies shortness of breath or diaphoresis.   No definitive aggravating or alleviating factors. She denies additional symptomology. She has past medical history that includes GERD and dyslipidemia. She was also noted to have a systolic blood pressure in the 140s to 150s. High-sensitivity troponin <6 x4. Hbg 15.3, hct 48.4. D dimer 0.42. EKG shows normal sinus rhythm, regular rate 96, no overt ST elevation or depression. Past Medical History:     Past Medical History:   Diagnosis Date    GERD (gastroesophageal reflux disease)     Hyperlipidemia     Insomnia       Past Surgical History:     Past Surgical History:   Procedure Laterality Date    HERNIA REPAIR          Medications Prior to Admission:     Prior to Admission medications    Medication Sig Start Date End Date Taking?  Authorizing Provider   zolpidem (AMBIEN) 5 MG tablet Take 5 mg by mouth nightly as needed for Sleep  Patient not taking: Reported on 5/2/2022    Historical Provider, MD   hydrOXYzine (ATARAX) 50 MG tablet Take 50 mg by mouth daily    Historical Provider, MD   ezetimibe (ZETIA) 10 MG tablet Take 10 mg by mouth daily    Historical Provider, MD   omeprazole (PRILOSEC) 40 MG capsule Take 40 mg by mouth daily    Historical Provider, MD   calcium-vitamin D (OSCAL) 250-125 MG-UNIT per tablet Take 1 tablet by mouth daily    Historical Provider, MD   Cholecalciferol (VITAMIN D) 2000 UNITS CAPS capsule Take by mouth    Historical Provider, MD   ibuprofen (ADVIL;MOTRIN) 800 MG tablet Take 1 tablet by mouth every 8 hours as needed for Pain 9/5/15   Trisha Grier MD   oxyCODONE-acetaminophen (PERCOCET) 5-325 MG per tablet Take 1-2 tablets by mouth every 4 hours as needed for Pain  Patient not taking: Reported on 5/2/2022 9/5/15   Trisha Grier MD   doxycycline (VIBRA-TABS) 100 MG tablet Take 1 tablet by mouth 2 times daily  Patient not taking: Reported on 5/2/2022 9/5/15   Trisha Grier MD        Allergies:     Dye [iodides]    Social History:     Tobacco: reports that she has quit smoking. She does not have any smokeless tobacco history on file. Alcohol:      reports current alcohol use. Drug Use:  reports no history of drug use. Family History:     Family History   Problem Relation Age of Onset    No Known Problems Mother     No Known Problems Father        Review of Systems:     Positive and Negative as described in HPI. Review of Systems   Constitutional: Negative for chills, diaphoresis and fever. HENT: Negative for congestion. Eyes: Negative for visual disturbance. Respiratory: Negative for cough and shortness of breath. Cardiovascular: Positive for chest pain. Negative for palpitations and leg swelling. Gastrointestinal: Negative for abdominal pain, blood in stool, constipation, diarrhea, nausea and vomiting. Endocrine: Negative for cold intolerance and heat intolerance. Genitourinary: Negative for difficulty urinating, dysuria, frequency and urgency. Musculoskeletal: Positive for arthralgias and myalgias. Skin: Negative for color change and rash. Neurological: Negative for dizziness, weakness, light-headedness, numbness and headaches. Hematological: Does not bruise/bleed easily. Psychiatric/Behavioral: The patient is not nervous/anxious. All other systems reviewed and are negative. Physical Exam:   BP (!) 152/95   Pulse 73   Temp 97.7 °F (36.5 °C) (Oral)   Resp 16   Ht 5' 6\" (1.676 m)   Wt 232 lb (105.2 kg)   SpO2 96%   BMI 37.45 kg/m²   Temp (24hrs), Av.8 °F (36.6 °C), Min:97.7 °F (36.5 °C), Max:97.8 °F (36.6 °C)    Recent Labs     22  2108   POCGLU 99     No intake or output data in the 24 hours ending 22 2239    Physical Exam  Vitals and nursing note reviewed. Constitutional:       Appearance: She is not diaphoretic. HENT:      Head: Normocephalic and atraumatic. Right Ear: Hearing normal.      Left Ear: Hearing normal.      Nose: Nose normal. No rhinorrhea.    Eyes:      General: Lids are normal.      Extraocular Movements:      Right eye: Normal extraocular motion. Left eye: Normal extraocular motion. Conjunctiva/sclera: Conjunctivae normal.      Right eye: Right conjunctiva is not injected. Left eye: Left conjunctiva is not injected. Pupils: Pupils are equal, round, and reactive to light. Pupils are equal.      Right eye: Pupil is reactive. Left eye: Pupil is reactive. Neck:      Thyroid: No thyromegaly. Trachea: Trachea normal. No tracheal deviation. Cardiovascular:      Rate and Rhythm: Normal rate and regular rhythm. Pulses: Normal pulses. Heart sounds: Normal heart sounds. Pulmonary:      Effort: Pulmonary effort is normal.      Breath sounds: Normal breath sounds. No decreased breath sounds. Abdominal:      General: Bowel sounds are normal. There is no distension. Palpations: Abdomen is soft. There is no mass. Tenderness: There is no abdominal tenderness. There is no guarding. Musculoskeletal:         General: No tenderness. Cervical back: Neck supple. Skin:     General: Skin is warm and dry. Neurological:      Mental Status: She is alert and oriented to person, place, and time.    Psychiatric:         Speech: Speech normal.         Behavior: Behavior normal.         Investigations:      Laboratory Testing:  Recent Results (from the past 24 hour(s))   EKG 12 Lead    Collection Time: 05/02/22  4:23 PM   Result Value Ref Range    Ventricular Rate 96 BPM    Atrial Rate 96 BPM    P-R Interval 144 ms    QRS Duration 82 ms    Q-T Interval 366 ms    QTc Calculation (Bazett) 462 ms    P Axis 62 degrees    R Axis -38 degrees    T Axis 37 degrees   TROP/MYOGLOBIN    Collection Time: 05/02/22  5:40 PM   Result Value Ref Range    Troponin, High Sensitivity <6 0 - 14 ng/L    Myoglobin 24 (L) 25 - 58 ng/mL   Basic Metabolic Panel    Collection Time: 05/02/22  5:40 PM   Result Value Ref Range    Glucose 101 (H) 70 - 99 mg/dL    BUN 13 8 - 23 mg/dL    CREATININE 0.70 0.50 - 0.90 mg/dL    Bun/Cre Ratio 19 9 - 20    Calcium 9.7 8.6 - 10.4 mg/dL    Sodium 142 135 - 144 mmol/L    Potassium 4.1 3.7 - 5.3 mmol/L    Chloride 103 98 - 107 mmol/L    CO2 25 20 - 31 mmol/L    Anion Gap 14 9 - 17 mmol/L    GFR Non-African American >60 >60 mL/min    GFR African American >60 >60 mL/min    GFR Comment         CBC with Auto Differential    Collection Time: 05/02/22  5:40 PM   Result Value Ref Range    WBC 8.2 3.5 - 11.3 k/uL    RBC 5.55 (H) 3.95 - 5.11 m/uL    Hemoglobin 15.3 (H) 11.9 - 15.1 g/dL    Hematocrit 48.4 (H) 36.3 - 47.1 %    MCV 87.2 82.6 - 102.9 fL    MCH 27.6 25.2 - 33.5 pg    MCHC 31.6 28.4 - 34.8 g/dL    RDW 13.6 11.8 - 14.4 %    Platelets 635 180 - 965 k/uL    MPV 8.7 8.1 - 13.5 fL    NRBC Automated 0.0 0.0 per 100 WBC    Seg Neutrophils 66 (H) 36 - 65 %    Lymphocytes 22 (L) 24 - 43 %    Monocytes 7 3 - 12 %    Eosinophils % 4 1 - 4 %    Basophils 1 0 - 2 %    Immature Granulocytes 0 0 %    Segs Absolute 5.45 1.50 - 8.10 k/uL    Absolute Lymph # 1.79 1.10 - 3.70 k/uL    Absolute Mono # 0.56 0.10 - 1.20 k/uL    Absolute Eos # 0.29 0.00 - 0.44 k/uL    Basophils Absolute 0.07 0.00 - 0.20 k/uL    Absolute Immature Granulocyte 0.02 0.00 - 0.30 k/uL   D-Dimer, Quantitative    Collection Time: 05/02/22  5:40 PM   Result Value Ref Range    D-Dimer, Quant 0.42 0.00 - 0.59 mg/L FEU   TROP/MYOGLOBIN    Collection Time: 05/02/22  7:24 PM   Result Value Ref Range    Troponin, High Sensitivity <6 0 - 14 ng/L    Myoglobin <21 (L) 25 - 58 ng/mL   POC Glucose Fingerstick    Collection Time: 05/02/22  9:08 PM   Result Value Ref Range    POC Glucose 99 65 - 105 mg/dL   TROP/MYOGLOBIN    Collection Time: 05/02/22  9:36 PM   Result Value Ref Range    Troponin, High Sensitivity <6 0 - 14 ng/L    Myoglobin 22 (L) 25 - 58 ng/mL       Imaging/Diagnostics:  XR SHOULDER LEFT (MIN 2 VIEWS)    Result Date: 5/2/2022  No acute cardiopulmonary process.  Mild degenerative changes left shoulder. XR CHEST PORTABLE    Result Date: 5/2/2022  No acute cardiopulmonary process. Mild degenerative changes left shoulder. Assessment :      Hospital Problems           Last Modified POA    * (Principal) Chest pain 5/2/2022 Yes    GERD (gastroesophageal reflux disease) 5/3/2022 Yes    Dyslipidemia 5/3/2022 Yes    Hypertension 5/3/2022 Yes    Class 2 severe obesity due to excess calories with serious comorbidity and body mass index (BMI) of 37.0 to 37.9 in adult [facility]) 5/3/2022 Yes        Plan:     Patient status inpatient in the  Med/Surge    1. Consult cardiology, appreciate recommendations. 2. Troponin negative. 3. Repeat EKG in AM.   4. Low-dose aspirin. 5. Hypertension- monitor and control blood pressure, start lisinopril pending cardiology recommendations. 6. Dyslipidemia- continue zetia. 7. GERD-protonix. 8. Obesity- lifestyle modifications. 9. DVT prophylaxis. 10. Telemetry. 11. Monitor vital signs. 12. Follow chemistries. 13. Replete electrolytes as needed. 14. NPO after midnight pending cardiology evaluation. 15. Activity as tolerated with assist.    Plan of care discussed with patient and Alyssia KUNZ. Consultations:   IP CONSULT TO HOSPITALIST  IP CONSULT TO CARDIOLOGY  IP CONSULT TO CARDIOLOGY    Patient is admitted as inpatient status because of co-morbidities listed above, severity of signs and symptoms as outlined, requirement for current medical therapies and most importantly because of direct risk to patient if care not provided in a hospital setting. Expected length of stay > 48 hours.     DINA Kaur - CNP  5/2/2022  10:39 PM    Copy sent to Dr. Tess Scott MD     (Please note that portions of this note were completed with a voice recognition program. Efforts were made to edit the dictations but occasionally words are mis-transcribed.)

## 2022-05-03 NOTE — ACP (ADVANCE CARE PLANNING)
Advance Care Planning     Advance Care Planning Activator (Inpatient)  Conversation Note      Date of ACP Conversation: 5/3/2022     Conversation Conducted with: Patient with Decision Making Capacity    ACP Activator: Babatunde Madrid RN        Health Care Decision Maker:     Current Designated Health Care Decision Maker:     Click here to complete Healthcare Decision Makers including section of the Healthcare Decision Maker Relationship (ie \"Primary\")  Today we documented Decision Maker(s) consistent with Legal Next of Kin hierarchy. Care Preferences    Ventilation: \"If you were in your present state of health and suddenly became very ill and were unable to breathe on your own, what would your preference be about the use of a ventilator (breathing machine) if it were available to you? \"      Would the patient desire the use of ventilator (breathing machine)?: yes    \"If your health worsens and it becomes clear that your chance of recovery is unlikely, what would your preference be about the use of a ventilator (breathing machine) if it were available to you? \"     Would the patient desire the use of ventilator (breathing machine)?: Yes      Resuscitation  \"CPR works best to restart the heart when there is a sudden event, like a heart attack, in someone who is otherwise healthy. Unfortunately, CPR does not typically restart the heart for people who have serious health conditions or who are very sick. \"    \"In the event your heart stopped as a result of an underlying serious health condition, would you want attempts to be made to restart your heart (answer \"yes\" for attempt to resuscitate) or would you prefer a natural death (answer \"no\" for do not attempt to resuscitate)? \" yes       [x] Yes   [] No   Educated Patient / Damien Lugo regarding differences between Advance Directives and portable DNR orders.     Length of ACP Conversation in minutes:      Conversation Outcomes:  [x] ACP discussion completed  [] Existing advance directive reviewed with patient; no changes to patient's previously recorded wishes  [] New Advance Directive completed  [] Portable Do Not Rescitate prepared for Provider review and signature  [] POLST/POST/MOLST/MOST prepared for Provider review and signature      Follow-up plan:    [] Schedule follow-up conversation to continue planning  [] Referred individual to Provider for additional questions/concerns   [] Advised patient/agent/surrogate to review completed ACP document and update if needed with changes in condition, patient preferences or care setting    [] This note routed to one or more involved healthcare providers

## 2022-05-03 NOTE — ED NOTES
ED to inpatient nurses report     Chief Complaint   Patient presents with    Chest Pain      Present to ED from home for chest pain/back pain/arm pain since Friday. Pt rates pain 4/10. Pt denies taking anything for pain PTA. Pt denies any cardiac hx. LOC: alert and orientated to name, place, date  Vital signs   Vitals:    05/02/22 1617 05/02/22 1839 05/02/22 1845 05/02/22 1900   BP: (!) 172/116 (!) 150/102 (!) 140/105 (!) 154/124   Pulse: 98 87 88 98   Resp: 18 20 21 23   Temp: 97.8 °F (36.6 °C)      TempSrc: Oral      SpO2: 97% 94% 93% 95%   Weight: 232 lb (105.2 kg)      Height: 5' 6\" (1.676 m)         Oxygen Baseline RA    Current needs required none   LDAs:   Peripheral IV 05/02/22 Right Antecubital (Active)   Site Assessment Clean, dry & intact 05/02/22 1927   Line Status Blood return noted; Flushed 05/02/22 1927   Line Care Cap changed 05/02/22 1927   Phlebitis Assessment No symptoms 05/02/22 1927   Infiltration Assessment 0 05/02/22 1927   Dressing Status New dressing applied 05/02/22 1927   Dressing Type Transparent 05/02/22 1927   Dressing Intervention New 05/02/22 1927     Mobility: Independent  Pending ED orders: none  Present condition: stable  Code Status: full  Consults: IP CONSULT TO HOSPITALIST  IP CONSULT TO CARDIOLOGY  IP CONSULT TO CARDIOLOGY  [x]  Hospitalist  Completed  [x] yes [] no Who:   []  Medicine  Completed  [] yes [] No Who:   [x]  Cardiology  Completed  [x] yes [] No Who:   []  GI   Completed  [] yes [] No Who:   []  Neurology  Completed  [] yes [] No Who:   []  Nephrology Completed  [] yes [] No Who:    []  Vascular  Completed  [] yes [] No Who:   []  Ortho  Completed  [] yes [] No Who:     []  Surgery  Completed  [] yes [] No Who:    []  Urology  Completed  [] yes [] No Who:    []  CT Surgery Completed  [] yes [] No Who:    []  Other    Completed  [] yes [] No Who:  Interventions: tramadol Q6hr PRN for pain. Last given at 2006.  Pt given lopressor for elevated BP  Important Events: Electronically signed by [facility] JOAQUINA Estrada on 5/2/2022 at 8:08 PM     JasbirBanner MD [facility] Center[facility] Estrada RN  05/02/22 2011

## 2022-05-03 NOTE — CARE COORDINATION
Case Management Initial Discharge Plan  Luer Noon,         Readmission Risk              Risk of Unplanned Readmission:  9             Met with:patient to discuss discharge plans. Information verified: address, contacts, phone number, , insurance Yes  PCP: Cornelius Mccarthy MD  Date of last visit: yesterday     Insurance Provider: Emery Pantoja     Discharge Planning  Current Residence:  Private home   Living Arrangements:    spouse skye Modi has 2 stories/2 stairs to climb to enter [facility]. Bed and bath are both downstairs   Support Systems:    spouse and children     Current Services PTA:  None  Agency: none    Patient able to perform ADL's:Independent  DME in home:  None   DME used to aid ambulation prior to admission:   None   DME used during admission:  None     Potential Assistance Needed:   none     Pharmacy: [facility]    Potential Assistance Purchasing Medications:   no   Does patient want to participate in local refill/ meds to beds program?  Yes    Patient agreeable to home care: No  Soldier of choice provided:  n/a      Type of Home Care Services:    none  Patient expects to be discharged to:   home    Prior SNF/Rehab Placement and Facility: none   Agreeable to SNF/Rehab: No  Soldier of choice provided: n/a   Evaluation: n/a    Expected Discharge date:      Follow Up Appointment: Best Day/ Time:  any     Transportation provider: per spouse   Transportation arrangements needed for discharge: No    Discharge Plan:   Met with patient to complete a discharge plan of care. Patient looks much younger than stated age. She Is independent. She drives. She is retired pharmacy from here. Patient is admitted with chest pain. Cardiology is consulted. Echo ordered. No anticipated needs but will follow. If echo normal outpatient stress will be arranged.      Electronically signed by Peyton Vivar RN on 5/3/22 at 3:25 PM EDT

## 2022-05-04 VITALS
HEIGHT: 66 IN | WEIGHT: 234.03 LBS | BODY MASS INDEX: 37.61 KG/M2 | HEART RATE: 77 BPM | TEMPERATURE: 98.1 F | RESPIRATION RATE: 16 BRPM | DIASTOLIC BLOOD PRESSURE: 83 MMHG | SYSTOLIC BLOOD PRESSURE: 136 MMHG | OXYGEN SATURATION: 96 %

## 2022-05-04 LAB
EKG ATRIAL RATE: 80 BPM
EKG P AXIS: 60 DEGREES
EKG P-R INTERVAL: 146 MS
EKG Q-T INTERVAL: 398 MS
EKG QRS DURATION: 80 MS
EKG QTC CALCULATION (BAZETT): 459 MS
EKG R AXIS: -34 DEGREES
EKG T AXIS: 26 DEGREES
EKG VENTRICULAR RATE: 80 BPM

## 2022-05-04 PROCEDURE — 6370000000 HC RX 637 (ALT 250 FOR IP): Performed by: NURSE PRACTITIONER

## 2022-05-04 PROCEDURE — G0378 HOSPITAL OBSERVATION PER HR: HCPCS

## 2022-05-04 PROCEDURE — 2580000003 HC RX 258: Performed by: NURSE PRACTITIONER

## 2022-05-04 PROCEDURE — 99217 PR OBSERVATION CARE DISCHARGE MANAGEMENT: CPT | Performed by: FAMILY MEDICINE

## 2022-05-04 RX ORDER — LISINOPRIL 5 MG/1
5 TABLET ORAL DAILY
Qty: 30 TABLET | Refills: 3 | Status: SHIPPED | OUTPATIENT
Start: 2022-05-05

## 2022-05-04 RX ADMIN — ASPIRIN 81 MG 81 MG: 81 TABLET ORAL at 08:52

## 2022-05-04 RX ADMIN — LISINOPRIL 5 MG: 5 TABLET ORAL at 08:52

## 2022-05-04 RX ADMIN — Medication 1 TABLET: at 08:53

## 2022-05-04 RX ADMIN — HYDROCODONE BITARTRATE AND ACETAMINOPHEN 1 TABLET: 5; 325 TABLET ORAL at 02:08

## 2022-05-04 RX ADMIN — ACETAMINOPHEN 650 MG: 325 TABLET ORAL at 06:51

## 2022-05-04 RX ADMIN — SODIUM CHLORIDE, PRESERVATIVE FREE 10 ML: 5 INJECTION INTRAVENOUS at 08:52

## 2022-05-04 ASSESSMENT — ENCOUNTER SYMPTOMS
BLOOD IN STOOL: 0
CONSTIPATION: 0
RHINORRHEA: 0
SHORTNESS OF BREATH: 0
CHEST TIGHTNESS: 0
NAUSEA: 0
ABDOMINAL PAIN: 0
COUGH: 0
VOMITING: 0
WHEEZING: 0
DIARRHEA: 0

## 2022-05-04 ASSESSMENT — PAIN SCALES - GENERAL
PAINLEVEL_OUTOF10: 3
PAINLEVEL_OUTOF10: 4

## 2022-05-04 NOTE — PROGRESS NOTES
The pt was discharged to home. The discharge instructions were given and reviewed with the pt. She was escorted to the exit in stable condition.

## 2022-05-04 NOTE — PLAN OF CARE
Problem: Discharge Planning  Goal: Discharge to home or other facility with appropriate resources  5/4/2022 0915 by Yamile Elizondo RN  Outcome: Progressing  5/4/2022 0429 by Radha Daly RN  Outcome: Progressing     Problem: Pain  Goal: Verbalizes/displays adequate comfort level or baseline comfort level  5/4/2022 0915 by Yamile Elizondo RN  Outcome: Progressing  5/4/2022 0429 by Radha Daly RN  Outcome: Progressing

## 2022-05-04 NOTE — DISCHARGE SUMMARY
[facility]  Office: [phone]  Jacey Lopez DO, Letty Jason MD, Christiane Lackey MD, Barry Gibbs MD, Zia Felix MD, Lynn Calvo MD, Heber Garcia MD, Viral Hairston MD, Lisa Nieto MD, Christy Pretty MD, Chandrakant Zapata DO, Janelle Brooks MD, Cole Min MD, Vira Estrella DO, Peterson Medina MD,  Errol Lopez DO, Boy Fletcher MD, Mark Jackson MD, Jessica Johnson CNP, [facility] CNP, Maggie Locke CNP, Lee [address] CNS, Spike Barillas CNP, Danny Matos CNP, Nuris Nicolas CNP, Betsey Martinez CNP, Sabine Dash CNP, Rosalind Velasco PA-C, Dinorah Kamara CNP, Devorah Park CNP, Jatinder Byrd CNP, Neri Kim CNP, Bill Drake CNP, [facility]elin ToneyS[facility]      Discharge Summary     Patient ID: Marta Epley  :  1950   MRN: [id number]     ACCOUNT:  [de-identified]   Patient Location :   Patient's PCP: Monse Junior MD  Admit Date: 2022   Discharge Date:  22     Length of Stay: 0  Code Status:  Full Code  Admitting Physician: Lelo Griffiths DO  Discharge Physician: Barry Gibbs MD     Active Discharge Diagnosis :     Primary Problem  Atypical chest pain      [facility] Problems    Diagnosis Date Noted    Class 2 severe obesity due to excess calories with serious comorbidity and body mass index (BMI) of 37.0 to 37.9 in adult ([facility] [E66.01, Z68.37]      Priority: Medium    Atypical chest pain [R07.89]      Priority: Medium    GERD (gastroesophageal reflux disease) [K21.9]      Priority: Medium    Dyslipidemia [E78.5]      Priority: Medium    Primary hypertension [I10]      Priority: Medium       Admission Condition:  fair     Discharged Condition: stable    Hospital Stay:     Hospital Course:   Marta Epley is a 80 y.o. female who was admitted for the management of   Atypical chest pain , presented to ER with Chest Pain  Patient presented to emergency room with left shoulder pain, left arm pain. Pain was moderate and affected her sleep. Patient had reported lifting a heavy backpack of her grandson 1 week before. She has underlying history of hyperlipidemia, GERD. Initial evaluation showed elevated blood pressure. Evaluation showed negative troponin, normal white count, hemoglobin 15.3. Echocardiogram showed preserved ejection fraction. Significant therapeutic interventions:   1. Atypical musculoskeletal pain. Acute coronary syndrome ruled out -symptomatic management. RICE therapy  2. GERD -controlled  3. Dyslipidemia -continue medication  4. Essential hypertension-lisinopril added. Significant Diagnostic Studies:   Labs / Micro:/Radiology  Recent Labs     05/03/22  0659 05/02/22  1740   WBC 6.5 8.2   HGB 14.4 15.3*   HCT 45.3 48.4*   MCV 86.6 87.2    307     Labs Renal Latest Ref Rng & Units 5/2/2022 2/11/2013   BUN 8 - 23 mg/dL 13 15   Cr 0.50 - 0.90 mg/dL 0.70 0.71   K 3.7 - 5.3 mmol/L 4.1 4.4   Na 135 - 144 mmol/L 142 142     Lab Results   Component Value Date    AST 20 11/04/2011     Lab Results   Component Value Date    TSH 2.71 11/04/2011     No results found for: HAV, HEPAIGM, HEPBIGM, HEPBCAB, HBEAG, HEPCAB  No results found for: VOL, APPEARANCE, COLORU, LABSPEC, LABPH, LEUKBLD, NITRU, GLUCOSEU, KETUA, UROBILINOGEN, KETUA, UROBILINOGEN, BILIRUBINUR, OCBU  No results found for: LABA1C  No results found for: EAG  No results found for: INR, PROTIME    XR SHOULDER LEFT (MIN 2 VIEWS)    Result Date: 5/3/2022  No acute cardiopulmonary process. Mild degenerative changes left shoulder. XR CHEST PORTABLE    Result Date: 5/3/2022  No acute cardiopulmonary process. Mild degenerative changes left shoulder.              Consultations:    Consults:     Final Specialist Recommendations/Findings:   IP CONSULT TO CARDIOLOGY  IP CONSULT TO CARDIOLOGY      The patient was seen and examined on day of discharge and this discharge summary is in conjunction with any daily progress note from day of discharge. Discharge plan:     Disposition: Home    Physician Follow Up: Susana Moreno MD  [facility]  [phone]    In 2 weeks  follow up    Harshad Hammer MD  [facility], [address] [place] [address] [phone]    In 1 week  post hospital visit       Requiring Further Evaluation/Follow Up POST HOSPITALIZATION/Incidental Findings: Follow outpatient with PCP. Diet: low fat, low cholesterol diet    Activity: As tolerated. Instructions to Patient: Follow-up with PCP. Discharge Medications:      Medication List      START taking these medications    lisinopril 5 MG tablet  Commonly known as: PRINIVIL;ZESTRIL  Take 1 tablet by mouth daily  Start taking on: May 5, 2022        CONTINUE taking these medications    aspirin 81 MG chewable tablet     calcium-vitamin D 250-125 MG-UNIT per tablet  Commonly known as: OSCAL     ezetimibe 10 MG tablet  Commonly known as: Betcjne Melton 40 MG delayed release capsule  Generic drug: omeprazole     vitamin D 50 MCG (2000 UT) Caps capsule           Where to Get Your Medications      These medications were sent to [facility] [facility], [address] [place] [address] [phone] - F [phone]  [address] [place] [address]    Phone: [phone]   · lisinopril 5 MG tablet         Time Spent on discharge is  25 mins in patient examination, evaluation, counseling as well as medication reconciliation, prescriptions for required medications, discharge plan and follow up. Electronically signed by   Mary Alice Luna MD  5/4/2022        Thank you Dr. Harshad Hammer MD for the opportunity to be involved in this patient's care.

## 2022-05-04 NOTE — DISCHARGE INSTR - COC
Continuity of Care Form    Patient Name: Jermaine Vergara   :  1950  MRN:  [id number]    Admit date:  2022  Discharge date:  ***    Code Status Order: Full Code   Advance Directives:      Admitting Physician:  Rebekah Huerta DO  PCP: Belen Amos MD    Discharging Nurse: [facility] Unit/Room#:   Discharging Unit Phone Number: ***    Emergency Contact:   Extended Emergency Contact Information  Primary Emergency Contact: Amos Schmidt, [address] [facility] [address] Phone: [phone]  Mobile Phone: [phone]  Relation: Spouse  Secondary Emergency Contact: Nanette Coburn, [address] [facility] [address] Phone: [phone]  Mobile Phone: [phone]  Relation: Child    Past Surgical History:  Past Surgical History:   Procedure Laterality Date    HERNIA REPAIR         Immunization History:   Immunization History   Administered Date(s) Administered    COVID-19, Pfizer Purple top, DILUTE for use, 12+ yrs, 30mcg/0.3mL dose 2021, 2021, 2021       Active Problems:  Patient Active Problem List   Diagnosis Code    Atypical chest pain R07.89    GERD (gastroesophageal reflux disease) K21.9    Dyslipidemia E78.5    Primary hypertension I10    Class 2 severe obesity due to excess calories with serious comorbidity and body mass index (BMI) of 37.0 to 37.9 in adult ([facility] E66.01, Z68.37       Isolation/Infection:   Isolation            No Isolation          Patient Infection Status       None to display            Nurse Assessment:  Last Vital Signs: /83   Pulse 77   Temp 98.1 °F (36.7 °C) (Oral)   Resp 16   Ht 5' 6\" (1.676 m)   Wt 234 lb 0.5 oz (106.2 kg)   SpO2 96%   BMI 37.77 kg/m²     Last documented pain score (0-10 scale): Pain Level: 3  Last Weight:   Wt Readings from Last 1 Encounters:   22 234 lb 0.5 oz (106.2 kg)     Mental Status:  {IP PT MENTAL STATUS:42329}    IV Access:  [address] IV ACCESS:703195394}    Nursing Mobility/ADLs:  Walking   {P DME AGVC:866274874}  Transfer  {P DME ZZLB:519930326}  Bathing  {CHP DME UNBX:194524078}  Dressing  {CHP DME TIPQ:039866757}  Toileting  {CHP DME JNTZ:401659255}  Feeding  {P DME SYND:656919150}  Med Admin  {P DME UYAA:515589391}  Med Delivery   {[facility] MED Delivery:236211335}    Wound Care Documentation and Therapy:        Elimination:  Continence: Bowel: {YES / AL:72363}  Bladder: {YES / MA:65871}  Urinary Catheter: {Urinary Catheter:557372561}   Colostomy/Ileostomy/Ileal Conduit: {YES / RI:68892}       Date of Last BM: ***  No intake or output data in the 24 hours ending 22 0903  I/O last 3 completed shifts:   In: 10 [I.V.:10]  Out: 350 [Urine:350]    Safety Concerns:     [address] Safety Concerns:759394619}    Impairments/Disabilities:      [address] Impairments/Disabilities:063759205}    Nutrition Therapy:  Current Nutrition Therapy:   [address] Diet List:668816268}    Routes of Feeding: {[facility] DME Other Feedings:055459571}  Liquids: {Slp liquid thickness:65433}  Daily Fluid Restriction: {P DME Yes amt example:143079667}  Last Modified Barium Swallow with Video (Video Swallowing Test): {Done Not Done GDRV:849151349}    Treatments at the Time of Hospital Discharge:   Respiratory Treatments: ***  Oxygen Therapy:  {Therapy; copd oxygen:86771}  Ventilator:    {[facility] Vent VEJW:125945975}    Rehab Therapies: {THERAPEUTIC INTERVENTION:0256918166}  Weight Bearing Status/Restrictions: [address] [employer] Weight Bearin}  Other Medical Equipment (for information only, NOT a DME order):  {EQUIPMENT:745249128}  Other Treatments: ***    Patient's personal belongings (please select all that are sent with patient):  {[facility] DME Belongings:108917000}    RN SIGNATURE:  {Esignature:552575030}    CASE MANAGEMENT/SOCIAL WORK SECTION    Inpatient Status Date: ***    Readmission Risk Assessment Score:  Readmission Risk              Risk of Unplanned Readmission:  0           Discharging to Facility/ Agency   Name:   Address:  Phone:  Fax:    Dialysis Facility (if applicable)   Name:  Address:  Dialysis Schedule:  Phone:  Fax:    / signature: {Esignature:366151923}    PHYSICIAN SECTION    Prognosis: {Prognosis:8090891802}    Condition at Discharge: Lenin Vregara Patient Condition:930911222}    Rehab Potential (if transferring to Rehab): {Prognosis:8463839530}    Recommended Labs or Other Treatments After Discharge: ***    Physician Certification: I certify the above information and transfer of [place] [facility]  is necessary for the continuing treatment of the diagnosis listed and that she requires {Admit to Appropriate Level of Care:19147} for {GREATER/LESS:002322588} 30 days.      Update Admission H&P: {CHP DME Changes in GYOBW:741445717}    PHYSICIAN SIGNATURE:  {Esignature:872428826}

## 2022-05-04 NOTE — PROGRESS NOTES
CLINICAL PHARMACY NOTE: MEDS TO BEDS    Total # of Prescriptions Filled: 1   The following medications were delivered to the patient:  · Lisinopril 5 mg    Additional Documentation:

## 2022-05-04 NOTE — PROGRESS NOTES
[facility]  Office: [address], DO, Jairo Barnard, DO, Ariel Betancourt, DO, Savage Quezada Blood, DO, Claudio Broussard MD, Giselle Roberts MD, Elvira Clark MD, Smitha Escalante MD, Harrison Pérez MD, Devika Spence MD, Jaci Evans MD, Jose Elias Rodriguez, DO, Milagros Anton, DO, Mohsen Cox MD,  Sissy Calderon, DO, Candice Mcgill MD, Leela León MD, Yonis García MD, Alea Moncada DO, Glory Britton MD, Delma Haley MD, Ariane Foley, [facility], [facility], CNP, Ayse Singh, CNP, Luis Fernando Lawrence, CNP, Bertram Damon, CNP, Marleny Flores, CNP, Ofe Mccartney PA-C, Charmel Osgood, DNP, Barby Anderson, [facility], Zoë Calderon, CNP, Tiny Cisneros, CNP, Codi Story, CNS, Marycruz Segura, DNP, Artie Sin, CNP, Adan Ferreira, CNP, [facility]      Daily Progress Note     Admit Date: 5/2/2022  Bed/Room No.  2014/2014-02  Admitting Physician : Elvira Clark MD  Code Status :Full Code  Hospital Day:  LOS: 0 days   Chief Complaint:     Chief Complaint   Patient presents with    Chest Pain     Principal Problem:    Atypical chest pain  Active Problems:    GERD (gastroesophageal reflux disease)    Dyslipidemia    Primary hypertension    Class 2 severe obesity due to excess calories with serious comorbidity and body mass index (BMI) of 37.0 to 37.9 in adult [facility])  Resolved Problems:    * No resolved hospital problems. *    Subjective : Interval History/Significant events :  05/04/22    Patient complains of left shoulder discomfort. She is able to move her left shoulder without any limitations in range of motion. Patient denies any chest pain, palpitations, dyspnea. Echocardiogram reviewed showed preserved ejection fraction. She had negative troponin. Vitals - Stable afebrile  Labs -negative troponin. Nursing notes , Consults notes reviewed. Overnight events and updates discussed with Nursing staff .    Background History:         Sigifredo Mejía is 80 y.o. female  Who was admitted to the hospital on 5/2/2022 for treatment of Atypical chest pain. Patient presented to emergency room with left shoulder pain, left arm pain. Pain was moderate and affected her sleep. Patient had reported lifting a heavy backpack of her grandson 1 week before. She has underlying history of hyperlipidemia, GERD. Initial evaluation showed elevated blood pressure. Evaluation showed negative troponin, normal white count, hemoglobin 15.3. Echocardiogram showed preserved ejection fraction. PMH:  Past Medical History:   Diagnosis Date    GERD (gastroesophageal reflux disease)     Hyperlipidemia     Insomnia       Allergies: Allergies   Allergen Reactions    Dye [Iodides] Shortness Of Breath      Medications :  lisinopril, 5 mg, Oral, Daily  calcium carb-cholecalciferol, 1 tablet, Oral, Daily  ezetimibe, 10 mg, Oral, Daily  pantoprazole, 40 mg, Oral, Daily  sodium chloride flush, 5-40 mL, IntraVENous, 2 times per day  aspirin, 81 mg, Oral, Daily  enoxaparin, 30 mg, SubCUTAneous, BID        Review of Systems   Review of Systems   Constitutional: Negative for appetite change, fatigue, fever and unexpected weight change. HENT: Negative for congestion, rhinorrhea and sneezing. Eyes: Negative for visual disturbance. Respiratory: Negative for cough, chest tightness, shortness of breath and wheezing. Cardiovascular: Negative for chest pain and palpitations. Gastrointestinal: Negative for abdominal pain, blood in stool, constipation, diarrhea, nausea and vomiting. Genitourinary: Negative for dysuria, enuresis, frequency and hematuria. Musculoskeletal: Negative for arthralgias and myalgias. Left shoulder pain   Skin: Negative for rash. Neurological: Negative for dizziness, weakness, light-headedness and headaches. Hematological: Does not bruise/bleed easily. Psychiatric/Behavioral: Negative for dysphoric mood and sleep disturbance.      Objective : Current Vitals : Temp: 98.1 °F (36.7 °C),  Pulse: 77, Resp: 16, BP: 136/83, SpO2: 96 %  Last 24 Hrs Vitals   Patient Vitals for the past 24 hrs:   BP Temp Temp src Pulse Resp SpO2 Weight   05/04/22 0802 136/83 98.1 °F (36.7 °C) Oral 77 16 96 % --   05/04/22 0200 -- -- -- -- -- -- 234 lb 0.5 oz (106.2 kg)   05/03/22 1838 (!) 131/97 98.2 °F (36.8 °C) Oral 92 16 92 % --     Intake / output   05/02 1901 - 05/04 0700  In: 10 [I.V.:10]  Out: 350 [Urine:350]  Physical Exam:  Physical Exam  Vitals and nursing note reviewed. Constitutional:       General: She is not in acute distress. Appearance: She is not diaphoretic. HENT:      Head: Normocephalic and atraumatic. Nose:      Right Sinus: No maxillary sinus tenderness or frontal sinus tenderness. Left Sinus: No maxillary sinus tenderness or frontal sinus tenderness. Mouth/Throat:      Pharynx: No oropharyngeal exudate. Eyes:      General: No scleral icterus. Conjunctiva/sclera: Conjunctivae normal.      Pupils: Pupils are equal, round, and reactive to light. Neck:      Thyroid: No thyromegaly. Vascular: No JVD. Cardiovascular:      Rate and Rhythm: Normal rate and regular rhythm. Pulses:           Dorsalis pedis pulses are 2+ on the right side and 2+ on the left side. Heart sounds: Normal heart sounds. No murmur heard. Pulmonary:      Effort: Pulmonary effort is normal.      Breath sounds: Normal breath sounds. No wheezing or rales. Abdominal:      Palpations: Abdomen is soft. There is no mass. Tenderness: There is no abdominal tenderness. Musculoskeletal:      Cervical back: Full passive range of motion without pain and neck supple. Lymphadenopathy:      Head:      Right side of head: No submandibular adenopathy. Left side of head: No submandibular adenopathy. Cervical: No cervical adenopathy. Skin:     General: Skin is warm.    Neurological:      Mental Status: She is alert and oriented to person, place, and time. Motor: No tremor. Psychiatric:         Behavior: Behavior is cooperative. Laboratory findings:    Recent Labs     05/02/22  1740 05/03/22  0659   WBC 8.2 6.5   HGB 15.3* 14.4   HCT 48.4* 45.3    151     Recent Labs     05/02/22  1740      K 4.1      CO2 25   GLUCOSE 101*   BUN 13   CREATININE 0.70   CALCIUM 9.7     No results for input(s): PROT, LABALBU, LABA1C, P2PQFFP, K0VNXAZ, FT4, TSH, AST, ALT, LDH, GGT, ALKPHOS, BILITOT, BILIDIR, AMMONIA, AMYLASE, LIPASE, LACTATE, CHOL, HDL, LDLCHOLESTEROL, CHOLHDLRATIO, TRIG, VLDL, BNP, TROPONINI, CKTOTAL, CKMB, CKMBINDEX, RF, RYLEE in the last 72 hours. No results found for: Frann Asp, RBCUA, BLOODU, BACTERIA, NITRU, WBCUA, LEUKOCYTESUR    Imaging / Clinical Data :-   XR SHOULDER LEFT (MIN 2 VIEWS)    Result Date: 5/3/2022  No acute cardiopulmonary process. Mild degenerative changes left shoulder. XR CHEST PORTABLE    Result Date: 5/3/2022  No acute cardiopulmonary process. Mild degenerative changes left shoulder. Clinical Course : stable  Assessment and Plan  :        1. Atypical musculoskeletal pain. Acute coronary syndrome ruled out -symptomatic management. RICE therapy  2. GERD -controlled  3. Dyslipidemia -continue medication    Discharge home. Follow-up with PCP. Continue to monitor vitals , Intake / output ,  Cell count , HGB , Kidney function, oxygenation  as indicated . Plan and updates discussed with patient ,  answers  explained to satisfaction.    Plan discussed with Staff Monisha Moreland RN     (Please note that portions of this note were completed with a voice recognition program. Efforts were made to edit the dictations but occasionally words are mis-transcribed.)      Chelsea Morse MD  5/4/2022

## 2023-03-14 ENCOUNTER — HOSPITAL ENCOUNTER (OUTPATIENT)
Age: 73
Setting detail: SPECIMEN
Discharge: HOME | End: 2023-03-14

## 2023-03-15 LAB
25(OH)D3 SERPL-MCNC: 42 NG/ML
ALBUMIN SERPL-MCNC: 4.2 G/DL (ref 3.5–5.2)
ALBUMIN/GLOBULIN RATIO: 1.3 (ref 1–2.5)
ALP SERPL-CCNC: 102 U/L (ref 35–104)
ALT SERPL-CCNC: 9 U/L (ref 5–33)
ANION GAP SERPL CALCULATED.3IONS-SCNC: 14 MMOL/L (ref 9–17)
AST SERPL-CCNC: 24 U/L
BILIRUB SERPL-MCNC: 0.7 MG/DL (ref 0.3–1.2)
BUN SERPL-MCNC: 11 MG/DL (ref 8–23)
CALCIUM SERPL-MCNC: 9.5 MG/DL (ref 8.6–10.4)
CHLORIDE SERPL-SCNC: 101 MMOL/L (ref 98–107)
CHOLEST SERPL-MCNC: 232 MG/DL
CHOLESTEROL/HDL RATIO: 4.3
CO2 SERPL-SCNC: 23 MMOL/L (ref 20–31)
CREAT SERPL-MCNC: 0.71 MG/DL (ref 0.5–0.9)
GFR SERPL CREATININE-BSD FRML MDRD: >60 ML/MIN/1.73M2
GLUCOSE SERPL-MCNC: 72 MG/DL (ref 70–99)
HCT VFR BLD AUTO: 42.9 % (ref 36.3–47.1)
HDLC SERPL-MCNC: 54 MG/DL
HGB BLD-MCNC: 13.8 G/DL (ref 11.9–15.1)
LDLC SERPL CALC-MCNC: 148 MG/DL (ref 0–130)
MCH RBC QN AUTO: 29.4 PG (ref 25.2–33.5)
MCHC RBC AUTO-ENTMCNC: 32.2 G/DL (ref 28.4–34.8)
MCV RBC AUTO: 91.3 FL (ref 82.6–102.9)
NRBC AUTOMATED: 0 PER 100 WBC
PDW BLD-RTO: 13.9 % (ref 11.8–14.4)
PLATELET # BLD AUTO: 437 K/UL (ref 138–453)
PMV BLD AUTO: 10.3 FL (ref 8.1–13.5)
POTASSIUM SERPL-SCNC: 4.9 MMOL/L (ref 3.7–5.3)
PROT SERPL-MCNC: 7.4 G/DL (ref 6.4–8.3)
RBC # BLD: 4.7 M/UL (ref 3.95–5.11)
SODIUM SERPL-SCNC: 138 MMOL/L (ref 135–144)
TRIGL SERPL-MCNC: 148 MG/DL
TSH SERPL-ACNC: 1.84 UIU/ML (ref 0.3–5)
WBC # BLD AUTO: 12.1 K/UL (ref 3.5–11.3)

## 2023-03-30 ENCOUNTER — HOSPITAL ENCOUNTER (OUTPATIENT)
Age: 73
Setting detail: SPECIMEN
Discharge: HOME | End: 2023-03-30

## 2023-03-30 LAB
BILIRUBIN URINE: NEGATIVE
COLOR: YELLOW
COMMENT UA: NORMAL
GLUCOSE UR STRIP.AUTO-MCNC: NEGATIVE MG/DL
KETONES UR STRIP.AUTO-MCNC: NEGATIVE MG/DL
LEUKOCYTE ESTERASE UR QL STRIP.AUTO: NEGATIVE
NITRITE UR QL STRIP.AUTO: NEGATIVE
PROT UR STRIP.AUTO-MCNC: 6.5 MG/DL (ref 5–8)
PROT UR STRIP.AUTO-MCNC: NEGATIVE MG/DL
SPECIFIC GRAVITY UA: 1.01 (ref 1–1.03)
TURBIDITY: CLEAR
URINE HGB: NEGATIVE
UROBILINOGEN, URINE: NORMAL

## 2024-03-22 ENCOUNTER — HOSPITAL ENCOUNTER (OUTPATIENT)
Age: 74
Setting detail: SPECIMEN
Discharge: HOME | End: 2024-03-22

## 2024-03-22 LAB
25(OH)D3 SERPL-MCNC: 41.4 NG/ML (ref 30–100)
ALBUMIN SERPL-MCNC: 4.3 G/DL (ref 3.5–5.2)
ALBUMIN/GLOB SERPL: 1 {RATIO} (ref 1–2.5)
ALP SERPL-CCNC: 87 U/L (ref 35–104)
ALT SERPL-CCNC: 12 U/L (ref 10–35)
ANION GAP SERPL CALCULATED.3IONS-SCNC: 12 MMOL/L (ref 9–16)
AST SERPL-CCNC: 22 U/L (ref 10–35)
BASOPHILS # BLD: 0.05 K/UL (ref 0–0.2)
BASOPHILS NFR BLD: 1 % (ref 0–2)
BILIRUB DIRECT SERPL-MCNC: <0.2 MG/DL (ref 0–0.3)
BILIRUB INDIRECT SERPL-MCNC: 0.6 MG/DL (ref 0–1)
BILIRUB SERPL-MCNC: 0.7 MG/DL (ref 0–1.2)
BUN SERPL-MCNC: 13 MG/DL (ref 8–23)
CALCIUM SERPL-MCNC: 9.8 MG/DL (ref 8.6–10.4)
CHLORIDE SERPL-SCNC: 103 MMOL/L (ref 98–107)
CHOLEST SERPL-MCNC: 224 MG/DL (ref 0–199)
CHOLESTEROL/HDL RATIO: 4
CO2 SERPL-SCNC: 27 MMOL/L (ref 20–31)
CREAT SERPL-MCNC: 0.7 MG/DL (ref 0.5–0.9)
EOSINOPHIL # BLD: 0.19 K/UL (ref 0–0.44)
EOSINOPHILS RELATIVE PERCENT: 2 % (ref 1–4)
ERYTHROCYTE [DISTWIDTH] IN BLOOD BY AUTOMATED COUNT: 14.3 % (ref 11.8–14.4)
GFR SERPL CREATININE-BSD FRML MDRD: >60 ML/MIN/1.73M2
GLUCOSE SERPL-MCNC: 83 MG/DL (ref 74–99)
HCT VFR BLD AUTO: 50.4 % (ref 36.3–47.1)
HCV AB SERPL QL IA: NONREACTIVE
HDLC SERPL-MCNC: 62 MG/DL
HGB BLD-MCNC: 15.8 G/DL (ref 11.9–15.1)
IMM GRANULOCYTES # BLD AUTO: 0.03 K/UL (ref 0–0.3)
IMM GRANULOCYTES NFR BLD: 0 %
LDLC SERPL CALC-MCNC: 139 MG/DL (ref 0–100)
LYMPHOCYTES NFR BLD: 1.6 K/UL (ref 1.1–3.7)
LYMPHOCYTES RELATIVE PERCENT: 20 % (ref 24–43)
MCH RBC QN AUTO: 28.2 PG (ref 25.2–33.5)
MCHC RBC AUTO-ENTMCNC: 31.3 G/DL (ref 28.4–34.8)
MCV RBC AUTO: 90 FL (ref 82.6–102.9)
MONOCYTES NFR BLD: 0.38 K/UL (ref 0.1–1.2)
MONOCYTES NFR BLD: 5 % (ref 3–12)
NEUTROPHILS NFR BLD: 72 % (ref 36–65)
NEUTS SEG NFR BLD: 5.74 K/UL (ref 1.5–8.1)
NRBC BLD-RTO: 0 PER 100 WBC
PLATELET # BLD AUTO: 346 K/UL (ref 138–453)
PMV BLD AUTO: 9.3 FL (ref 8.1–13.5)
POTASSIUM SERPL-SCNC: 4.5 MMOL/L (ref 3.7–5.3)
PROT SERPL-MCNC: 7.4 G/DL (ref 6.6–8.7)
RBC # BLD AUTO: 5.6 M/UL (ref 3.95–5.11)
SODIUM SERPL-SCNC: 142 MMOL/L (ref 136–145)
TRIGL SERPL-MCNC: 116 MG/DL (ref 0–149)
TSH SERPL DL<=0.05 MIU/L-ACNC: 1.67 UIU/ML (ref 0.27–4.2)
VLDLC SERPL CALC-MCNC: 23 MG/DL
WBC OTHER # BLD: 8 K/UL (ref 3.5–11.3)

## 2024-08-13 ENCOUNTER — OFFICE VISIT (OUTPATIENT)
Age: 74
End: 2024-08-13
Payer: MEDICARE

## 2024-08-13 VITALS — HEIGHT: 66 IN | BODY MASS INDEX: 37.61 KG/M2 | WEIGHT: 234 LBS

## 2024-08-13 DIAGNOSIS — M25.511 RIGHT SHOULDER PAIN, UNSPECIFIED CHRONICITY: Primary | ICD-10-CM

## 2024-08-13 PROCEDURE — 3017F COLORECTAL CA SCREEN DOC REV: CPT | Performed by: ORTHOPAEDIC SURGERY

## 2024-08-13 PROCEDURE — G8427 DOCREV CUR MEDS BY ELIG CLIN: HCPCS | Performed by: ORTHOPAEDIC SURGERY

## 2024-08-13 PROCEDURE — 1036F TOBACCO NON-USER: CPT | Performed by: ORTHOPAEDIC SURGERY

## 2024-08-13 PROCEDURE — G8400 PT W/DXA NO RESULTS DOC: HCPCS | Performed by: ORTHOPAEDIC SURGERY

## 2024-08-13 PROCEDURE — 99204 OFFICE O/P NEW MOD 45 MIN: CPT | Performed by: ORTHOPAEDIC SURGERY

## 2024-08-13 PROCEDURE — 1090F PRES/ABSN URINE INCON ASSESS: CPT | Performed by: ORTHOPAEDIC SURGERY

## 2024-08-13 PROCEDURE — 1123F ACP DISCUSS/DSCN MKR DOCD: CPT | Performed by: ORTHOPAEDIC SURGERY

## 2024-08-13 PROCEDURE — G8417 CALC BMI ABV UP PARAM F/U: HCPCS | Performed by: ORTHOPAEDIC SURGERY

## 2024-08-13 RX ORDER — METHYLPREDNISOLONE 4 MG/1
TABLET ORAL
Qty: 1 KIT | Refills: 0 | Status: SHIPPED | OUTPATIENT
Start: 2024-08-13 | End: 2024-08-13 | Stop reason: CLARIF

## 2024-08-13 NOTE — PROGRESS NOTES
[facility] Orthopedics & Sports Medicine      [facility] PHYSICIANS [facility], [facility]  [facility] ORTHOPAEDICS AND SPORTS MEDICINE  [address]  [place] [address]  Dept: [phone]  Dept Fax: [phone]    Chief Compliant:  Chief Complaint   Patient presents with    Fracture     L hand        History of Present Illness:  This is a 73 y.o. female who presents to the clinic today for evaluation / follow up of left wrist and hand pain.  She had a fall.  This was on July 23.  She did go to an urgent care and she was placed in a splint.  She is here today for further evaluation.  She denies any numbness or tingling.       Physical Exam:    On examination today we can see that there is some swelling over the wrist itself.  There is some very minor tenderness today over the trapezium and CMC joint.  She tolerates loading the CMC joint of the thumb quite well.  She has good wrist flexion extension with some stiffness given that she has been in a splint.  The skin is intact.  Sensation tact light touch throughout the hand.    Nursing note and vitals reviewed.     Labs and Imaging: CT scan and x-rays of the left wrist and hand were reviewed that shows that she could have a small fracture of the trapezium which I would suspect more of a bone spur that potentially could have broken off given that pattern of this fracture.  There is no joint dislocation.  They had some concern about a nondisplaced capitate fracture but given her clinical exam think this is unlikely.    XR taken today:  No results found.      No orders of the defined types were placed in this encounter.      Assessment and Plan:  1. Right shoulder pain, unspecified chronicity          This is a 73 y.o. female with left wrist sprain possible small fracture off the trapezium.  Overall I think this could even be just a bone spur however she did show me her injury pictures and she had quite a bit of ecchymosis and bruising of

## 2025-04-29 ENCOUNTER — HOSPITAL ENCOUNTER (OUTPATIENT)
Dept: DIABETES SERVICES | Age: 75
Setting detail: THERAPIES SERIES
Discharge: HOME | End: 2025-04-29
Payer: MEDICARE

## 2025-04-29 DIAGNOSIS — E66.812 CLASS 2 SEVERE OBESITY DUE TO EXCESS CALORIES WITH SERIOUS COMORBIDITY AND BODY MASS INDEX (BMI) OF 37.0 TO 37.9 IN ADULT (HCC): ICD-10-CM

## 2025-04-29 DIAGNOSIS — E66.01 CLASS 2 SEVERE OBESITY DUE TO EXCESS CALORIES WITH SERIOUS COMORBIDITY AND BODY MASS INDEX (BMI) OF 37.0 TO 37.9 IN ADULT (HCC): ICD-10-CM

## 2025-04-29 DIAGNOSIS — E78.5 DYSLIPIDEMIA: Primary | ICD-10-CM

## 2025-04-29 DIAGNOSIS — I10 PRIMARY HYPERTENSION: ICD-10-CM

## 2025-04-29 DIAGNOSIS — K21.9 GASTROESOPHAGEAL REFLUX DISEASE, UNSPECIFIED WHETHER ESOPHAGITIS PRESENT: ICD-10-CM

## 2025-04-29 PROCEDURE — 97802 MEDICAL NUTRITION INDIV IN: CPT

## 2025-04-29 NOTE — PROGRESS NOTES
Namrata Saenz   1950      Namrata Saenz here 4/29/2025 for 1st nutrition education session, present with spouse Today's visit was in a individual setting. Pt referred for weight loss and elevated cholesterol.    S: Patient states: she wasn't happy that her doctor referred her to a dietitian but came today because she didn't want to appear non-compliant.    O: Ht: 5'6\", Wt: 223.5#, BMI: 36, Ideal body weight: 130#, ABW: 167.2#, Pt goal wt: NA, Labs: , HDL 57, , , Chol/HDL 3.8, Dx: HTN, GERD, Dyslipidemia, Obesity    A:  Pt  Weight has decreased 17.5# pounds over last few years  Dietary Assessment:  Est calorie needs at 6711-1653 based on 15-18 calories/kg CBW for BMI 36 Pt wakes around 5-6am and first meal around 9-10am, may snack or eat lunch then light supper or if skips lunch will eat a larger supper. Pt likes most foods and makes fairly healthy choices. Pt cooks but many meals are eaten out.Tends to eat at 4 restaurants when going out. This is where extra calories may come in. Will take food home vs eating full portions. Orders 2 Margaritas once weekly. Avoids salt, drinks water, coffee or diet vernors. Pt's favorite foods are donuts, baked deserts, french fries, chips and onion rings. Tries to limit portions. Discussed concept of progress not perfection and the 80/20 rule.  Understanding of diet pattern:  good  Expected adherence to pattern:  adequately    P: Today pt was instructed on the basics of eating to improve lipids and lose weight. Used Dinner Plate concept for balanced eating and gave tips for limiting fat, increasing fruits and veges and fiber. Emphasized eating within 4 1/2-5 hour timepoints to prevent overeating. Encouraged pt to buy small bags of chips or portion out of larger bags, if has a fried food at one meal avoid high fat choices at other meals. Aim for 8 cups water daily, 1 fruit with each meal and free veges as much as possible. Encouraged \"interrupting sitting\"

## 2025-06-03 ENCOUNTER — HOSPITAL ENCOUNTER (OUTPATIENT)
Dept: DIABETES SERVICES | Age: 75
Setting detail: THERAPIES SERIES
Discharge: HOME | End: 2025-06-03
Payer: MEDICARE

## 2025-06-03 DIAGNOSIS — I10 PRIMARY HYPERTENSION: Primary | ICD-10-CM

## 2025-06-03 DIAGNOSIS — E78.5 DYSLIPIDEMIA: ICD-10-CM

## 2025-06-03 DIAGNOSIS — E66.01 CLASS 2 SEVERE OBESITY DUE TO EXCESS CALORIES WITH SERIOUS COMORBIDITY AND BODY MASS INDEX (BMI) OF 37.0 TO 37.9 IN ADULT (HCC): ICD-10-CM

## 2025-06-03 DIAGNOSIS — K21.9 GASTROESOPHAGEAL REFLUX DISEASE, UNSPECIFIED WHETHER ESOPHAGITIS PRESENT: ICD-10-CM

## 2025-06-03 DIAGNOSIS — E66.812 CLASS 2 SEVERE OBESITY DUE TO EXCESS CALORIES WITH SERIOUS COMORBIDITY AND BODY MASS INDEX (BMI) OF 37.0 TO 37.9 IN ADULT (HCC): ICD-10-CM

## 2025-06-03 PROCEDURE — 97803 MED NUTRITION INDIV SUBSEQ: CPT

## 2025-06-03 NOTE — PROGRESS NOTES
Namrata Saenz   1950      Namrata Saenz here 6/3/25 for 2nd nutrition education session. Today's visit was in a individual setting. Pt originally referred for weight loss and elevated cholesterol.    S: Patient states: she wasn't able to focus on her diet as much as she hoped because her  fell and broke his ankle. He's needed surgery and has gone to a rehab facility.    O: Ht: 5'6\", Wt: did not weigh today, BMI: 36, Ideal body weight: 130#, ABW: 167.2#, Pt goal wt: NA, Labs: , HDL 57, , , Chol/HDL 3.8, Dx: HTN, GERD, Dyslipidemia, Obesity    A:  Pt  Weight has decreased 17.5# pounds over last few years  Dietary Assessment:  Pt states she's been eating healthier since her  hasn't been around. He's more the junk food eater. She has been eating out and getting drinks but eating more fruits and veges paula when home. Spouse to return home this week.    Understanding of diet pattern:  good  Expected adherence to pattern:  adequately    P: Today reiterated the basics of eating to improve lipids and lose weight. Reviewed Dinner Plate concept for balanced eating and gave tips for limiting fat, increasing fruits and veges and fiber. Emphasized eating within 4 1/2-5 hour timepoints to prevent overeating. Encouraged pt to buy small bags of chips or portion out of larger bags, if has a fried food at one meal avoid high fat choices at other meals. Aim for 8 cups water daily, 1 fruit with each meal and free veges as much as possible. Encouraged \"interrupting sitting\" to get more physical activity. Suggested food record keeping and it's benefits.  Materials given include: no new materials since giving pt an Individualized meal plan for 1500 Calories, Diet Packet, Guide to Heart Healthy Eating, and Label Reading Guide Guide to Dining Out and Fast Food Choices and snack suggestions at initial visit.    Patient voiced understanding and plans to try suggestions given. She was given RD phone

## 2025-07-08 ENCOUNTER — HOSPITAL ENCOUNTER (OUTPATIENT)
Dept: DIABETES SERVICES | Age: 75
Setting detail: THERAPIES SERIES
Discharge: HOME | End: 2025-07-08
Payer: MEDICARE

## 2025-07-08 DIAGNOSIS — E78.5 DYSLIPIDEMIA: ICD-10-CM

## 2025-07-08 DIAGNOSIS — E66.812 CLASS 2 SEVERE OBESITY DUE TO EXCESS CALORIES WITH SERIOUS COMORBIDITY AND BODY MASS INDEX (BMI) OF 37.0 TO 37.9 IN ADULT (HCC): ICD-10-CM

## 2025-07-08 DIAGNOSIS — K21.9 GASTROESOPHAGEAL REFLUX DISEASE, UNSPECIFIED WHETHER ESOPHAGITIS PRESENT: ICD-10-CM

## 2025-07-08 DIAGNOSIS — E66.01 CLASS 2 SEVERE OBESITY DUE TO EXCESS CALORIES WITH SERIOUS COMORBIDITY AND BODY MASS INDEX (BMI) OF 37.0 TO 37.9 IN ADULT (HCC): ICD-10-CM

## 2025-07-08 DIAGNOSIS — I10 PRIMARY HYPERTENSION: Primary | ICD-10-CM

## 2025-07-08 PROCEDURE — 97803 MED NUTRITION INDIV SUBSEQ: CPT

## 2025-07-08 NOTE — PROGRESS NOTES
Namrata Saenz   1950      Namrata Saenz here 7/8/25 for 3rd nutrition education session. Today's visit was in a individual setting. Pt originally referred for weight loss and elevated cholesterol.    S: Patient states: she's not been eating out as much since her  came home from the rehab facility as he still has difficulty getting around (recovering from ankle surgery). 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     O: Ht: 5'6\", Wt: did not weigh today, BMI: 36, Ideal body weight: 130#, ABW: 167.2#, Pt goal wt: NA, Labs: , HDL 57, , , Chol/HDL 3.8, Dx: HTN, GERD, Dyslipidemia, Obesity    A:  Pt  Weight has decreased 17.5# pounds over last few years  Dietary Assessment:  Pt doesn't feel she's been able to focus on her diet but in talking with her she has and is making gradual changes. Her activity has increased bc of household responsibilities and caring for spouse. She has been cooking more (always having vegetables and protein with meals). Works in fruit daily and is limiting starches. She drinks at least 2- 16oz oneil daily.   Understanding of diet pattern:  good  Expected adherence to pattern:  adequately    P: Today reiterated the basics of eating to improve lipids and lose weight as discussed before. Reviewed Dinner Plate concept for balanced eating and gave tips for limiting fat, increasing fruits and veges and fiber. Emphasized eating within 4 1/2-5 hour timepoints to prevent overeating. Encouraged pt to buy small bags of chips or portion out of larger bags, if has a fried food at one meal avoid high fat choices at other meals. Aim for 64 water daily, 1 fruit with each meal and free veges as much as possible. Encouraged \"interrupting sitting\" to get more physical activity. Emphasized keeping a food record and it's benefits and focusing on small, regular changes.  Materials given include: no new materials since giving pt an Individualized meal plan for